# Patient Record
Sex: MALE | URBAN - METROPOLITAN AREA
[De-identification: names, ages, dates, MRNs, and addresses within clinical notes are randomized per-mention and may not be internally consistent; named-entity substitution may affect disease eponyms.]

---

## 2017-03-08 PROBLEM — I50.42 CHRONIC COMBINED SYSTOLIC AND DIASTOLIC CONGESTIVE HEART FAILURE (HCC): Status: ACTIVE | Noted: 2017-03-08

## 2017-04-05 PROBLEM — D58.2 ELEVATED HEMOGLOBIN (HCC): Status: ACTIVE | Noted: 2017-04-05

## 2024-01-25 ENCOUNTER — LAB REQUISITION (OUTPATIENT)
Dept: LAB | Age: 89
End: 2024-01-25

## 2024-01-25 DIAGNOSIS — Z13.9 ENCOUNTER FOR SCREENING, UNSPECIFIED: ICD-10-CM

## 2024-01-25 PROCEDURE — PSEU9049 QUANTIFERON TB PLUS: Performed by: CLINICAL MEDICAL LABORATORY

## 2024-01-25 PROCEDURE — 86480 TB TEST CELL IMMUN MEASURE: CPT | Performed by: CLINICAL MEDICAL LABORATORY

## 2024-01-27 LAB
GAMMA INTERFERON BACKGROUND BLD IA-ACNC: 0.04 IU/ML
M TB IFN-G BLD-IMP: NEGATIVE
M TB IFN-G CD4+ BCKGRND COR BLD-ACNC: 0 IU/ML
M TB IFN-G CD4+CD8+ BCKGRND COR BLD-ACNC: 0 IU/ML
MITOGEN IGNF BCKGRD COR BLD-ACNC: 8.22 IU/ML

## 2024-04-27 ENCOUNTER — APPOINTMENT (OUTPATIENT)
Dept: CT IMAGING | Facility: HOSPITAL | Age: 89
End: 2024-04-27
Attending: NURSE PRACTITIONER
Payer: MEDICARE

## 2024-04-27 ENCOUNTER — APPOINTMENT (OUTPATIENT)
Dept: GENERAL RADIOLOGY | Facility: HOSPITAL | Age: 89
End: 2024-04-27
Attending: NURSE PRACTITIONER
Payer: MEDICARE

## 2024-04-27 ENCOUNTER — APPOINTMENT (OUTPATIENT)
Dept: CT IMAGING | Facility: HOSPITAL | Age: 89
End: 2024-04-27
Payer: MEDICARE

## 2024-04-27 ENCOUNTER — HOSPITAL ENCOUNTER (EMERGENCY)
Facility: HOSPITAL | Age: 89
Discharge: HOME | End: 2024-04-28
Payer: MEDICARE

## 2024-04-27 DIAGNOSIS — Z79.01 ANTICOAGULATED: ICD-10-CM

## 2024-04-27 DIAGNOSIS — S09.90XA CLOSED HEAD INJURY, INITIAL ENCOUNTER: ICD-10-CM
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
## 2024-04-27 PROCEDURE — 72170 X-RAY EXAM OF PELVIS: CPT | Performed by: NURSE PRACTITIONER

## 2024-04-27 PROCEDURE — 99284 EMERGENCY DEPT VISIT MOD MDM: CPT

## 2024-04-27 PROCEDURE — 71045 X-RAY EXAM CHEST 1 VIEW: CPT | Performed by: NURSE PRACTITIONER

## 2024-04-27 PROCEDURE — 70450 CT HEAD/BRAIN W/O DYE: CPT

## 2024-04-27 PROCEDURE — 72125 CT NECK SPINE W/O DYE: CPT | Performed by: NURSE PRACTITIONER

## 2024-04-28 ENCOUNTER — APPOINTMENT (OUTPATIENT)
Dept: GENERAL RADIOLOGY | Facility: HOSPITAL | Age: 89
End: 2024-04-28
Attending: STUDENT IN AN ORGANIZED HEALTH CARE EDUCATION/TRAINING PROGRAM
Payer: MEDICARE

## 2024-04-28 ENCOUNTER — HOSPITAL ENCOUNTER (EMERGENCY)
Facility: HOSPITAL | Age: 89
Discharge: HOME | End: 2024-04-29
Attending: STUDENT IN AN ORGANIZED HEALTH CARE EDUCATION/TRAINING PROGRAM
Payer: MEDICARE

## 2024-04-28 ENCOUNTER — APPOINTMENT (OUTPATIENT)
Dept: CT IMAGING | Facility: HOSPITAL | Age: 89
End: 2024-04-28
Attending: STUDENT IN AN ORGANIZED HEALTH CARE EDUCATION/TRAINING PROGRAM
Payer: MEDICARE

## 2024-04-28 VITALS
HEART RATE: 69 BPM | DIASTOLIC BLOOD PRESSURE: 95 MMHG | TEMPERATURE: 98 F | BODY MASS INDEX: 28 KG/M2 | HEIGHT: 69 IN | SYSTOLIC BLOOD PRESSURE: 185 MMHG | RESPIRATION RATE: 17 BRPM | OXYGEN SATURATION: 93 %
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
## 2024-04-28 PROCEDURE — 70450 CT HEAD/BRAIN W/O DYE: CPT | Performed by: STUDENT IN AN ORGANIZED HEALTH CARE EDUCATION/TRAINING PROGRAM

## 2024-04-28 PROCEDURE — 72125 CT NECK SPINE W/O DYE: CPT | Performed by: STUDENT IN AN ORGANIZED HEALTH CARE EDUCATION/TRAINING PROGRAM

## 2024-04-28 PROCEDURE — 73502 X-RAY EXAM HIP UNI 2-3 VIEWS: CPT | Performed by: STUDENT IN AN ORGANIZED HEALTH CARE EDUCATION/TRAINING PROGRAM

## 2024-04-28 PROCEDURE — 99285 EMERGENCY DEPT VISIT HI MDM: CPT

## 2024-04-28 PROCEDURE — 99284 EMERGENCY DEPT VISIT MOD MDM: CPT

## 2024-04-28 NOTE — ED PROVIDER NOTES
Patient Seen in: [facility] Emergency Department      History     Chief Complaint   Patient presents with    Fall     Stated Complaint: Fall on thinners    Subjective:   89yo/m w hx of CHF, MI, pHTN, CAD reports to the ED w c/o a fall. He was at a SNF, fall was not witnessed. He is alert to person and place at baseline and reports \"I am not injured\". He has a posterior scalp hematoma, is anticoagulated but denies headache, dizziness. Denies chest pain. Denies vomiting. Denies any complaints.             Objective:   Past Medical History:    Acute on chronic systolic congestive heart failure (HCC)    Chronic combined systolic and diastolic congestive heart failure (HCC)    Chronic sinus bradycardia    Chronic systolic congestive heart failure (HCC)    Coronary artery disease involving native heart without angina pectoris, unspecified vessel or lesion type    Elevated hemoglobin (HCC)    History of myocardial infarction    Hypertensive CHF (congestive heart failure) (HCC)    Pulmonary hypertension (HCC)              History reviewed. No pertinent surgical history.             Social History     Socioeconomic History    Marital status:    Tobacco Use    Smoking status: Never    Smokeless tobacco: Never   Substance and Sexual Activity    Alcohol use: No     Alcohol/week: 0.0 standard drinks of alcohol    Drug use: No     Social Determinants of Health     Financial Resource Strain: Low Risk  (8/2/2023)    Received from [facility]    Overall Financial Resource Strain (CARDIA)     Difficulty of Paying Living Expenses: Not very hard   Food Insecurity: No Food Insecurity (8/2/2023)    Received from [facility]    Hunger Vital Sign     Worried About Running Out of Food in the Last Year: Never true     Ran Out of Food in the Last Year: Never true   Transportation Needs: No Transportation Needs (8/2/2023)    Received from [facility]    PRAPARE - Transportation     Lack of  Transportation (Medical): No     Lack of Transportation (Non-Medical): No   Physical Activity: Unknown (8/2/2023)    Received from [facility]    Exercise Vital Sign     Days of Exercise per Week: 0 days   Stress: Unknown (7/26/2022)    Received from [facility]    [employer] [place] [employer] - Occupational Stress Questionnaire     Feeling of Stress : Patient declined   Social Connections: Unknown (8/2/2023)    Received from [facility]    Social Connection and Isolation Panel [NHANES]     Frequency of Communication with Friends and Family: More than three times a week     Frequency of Social Gatherings with Friends and Family: More than three times a week     Attends Restorationism Services: Patient declined     Active Member of Clubs or Organizations: No     Marital Status:    Housing Stability: Low Risk  (8/2/2023)    Received from [facility]    Housing Stability Vital Sign     Unable to Pay for Housing in the Last Year: No     Number of Places Lived in the Last Year: 1     In the last 12 months, was there a time when you did not have a steady place to sleep or slept in a shelter (including now)?: No              Review of Systems   All other systems reviewed and are negative.      Positive for stated complaint: Fall on thinners  Other systems are as noted in HPI.  Constitutional and vital signs reviewed.      All other systems reviewed and negative except as noted above.    Physical Exam     ED Triage Vitals [04/27/24 2240]   BP (!) 179/92   Pulse 71   Resp 20   Temp 98.1 °F (36.7 °C)   Temp src Oral   SpO2 94 %   O2 Device None (Room air)       Current:BP (!) 179/92   Pulse 71   Temp 98.1 °F (36.7 °C) (Oral)   Resp 20   Ht 175.3 cm (5' 9\")   SpO2 94%   BMI 28.35 kg/m²         Physical Exam  Vitals and nursing note reviewed.   Constitutional:       General: He is not in acute distress.     Appearance: He is well-developed.   HENT:      Head:  Normocephalic.      Comments: Posterior scalp hematoma, no crepitus, no edema     Nose: Nose normal.      Mouth/Throat:      Mouth: Mucous membranes are moist.   Eyes:      Conjunctiva/sclera: Conjunctivae normal.      Pupils: Pupils are equal, round, and reactive to light.   Cardiovascular:      Rate and Rhythm: Normal rate and regular rhythm.      Heart sounds: Normal heart sounds.   Pulmonary:      Effort: Pulmonary effort is normal.      Breath sounds: Normal breath sounds.   Abdominal:      General: Bowel sounds are normal.      Palpations: Abdomen is soft.   Musculoskeletal:         General: No tenderness or deformity. Normal range of motion.      Cervical back: Normal range of motion and neck supple.   Skin:     General: Skin is warm and dry.      Capillary Refill: Capillary refill takes less than 2 seconds.      Findings: No rash.      Comments: Normal color   Neurological:      General: No focal deficit present.      Mental Status: He is alert and oriented to person, place, and time.      GCS: GCS eye subscore is 4. GCS verbal subscore is 5. GCS motor subscore is 6.      Cranial Nerves: No cranial nerve deficit.      Gait: Gait normal.               ED Course   Labs Reviewed - No data to display     IMPRESSION:  Head:  -No evidence of an acute intracranial abnormality or acute fractures  -Mild chronic small vessel ischemic changes in the white matter; mild volume loss and mild ventriculomegaly    C-spine:  -No evidence of an acute fracture or dislocation  -Severe multilevel degenerative changes and severe multilevel foraminal stenosis    -Moderate tracheal dilatation; incomplete fusion of C1 posteriorly       IMPRESSION:  Chest:  -No significant focal consolidation or pleural effusions; mild, nonspecific interstitial prominence  -Mild enlargement of the cardiac silhouette    -Severe atherosclerosis    Pelvis:  -No evidence of acute fractures    -Sclerotic focus in the left femoral neck       MDM                 Medical Decision Making  89yo/m w hx and exam as stated c/o fall    Non toxic, well appearing  Afebrile  No vomiting  At baseline  Ct brain/neck non acute  Xr chest and pelv non acute  No somatic complaints  Overall stable    Plan  Dc to SNF  Close fu      Amount and/or Complexity of Data Reviewed  Labs:  Decision-making details documented in ED Course.  Radiology:  Decision-making details documented in ED Course.    Risk  OTC drugs.  Prescription drug management.        Disposition and Plan     Clinical Impression:  1. Fall, initial encounter    2. Closed head injury, initial encounter    3. Anticoagulated         Disposition:  Discharge  4/28/2024 12:24 am    Follow-up:  Oscar Pittman MD  [address]  [address] [place], [place] [id number] [phone]    Follow up in 2 day(s)      We recommend that you schedule follow up care with a primary care provider within the next three months to obtain basic health screening including reassessment of your blood pressure.      Medications Prescribed:  Current Discharge Medication List

## 2024-04-28 NOTE — ED QUICK NOTES
Spoke with Parveen at [facility] and gave report on patient returning.

## 2024-04-28 NOTE — ED INITIAL ASSESSMENT (HPI)
Pt here by EMS after unwitnessed fall on blood thinners, small hematoma to back of head. Pt A+ox2. Hx dementia.

## 2024-04-29 VITALS
RESPIRATION RATE: 22 BRPM | HEART RATE: 62 BPM | TEMPERATURE: 97 F | OXYGEN SATURATION: 90 % | SYSTOLIC BLOOD PRESSURE: 159 MMHG | DIASTOLIC BLOOD PRESSURE: 92 MMHG

## 2024-04-29 NOTE — ED QUICK NOTES
Pt returning home back to [facility]. Called and spoke with Jo-Ann to inform her that pt will be returning back to facility [facility].    [facility] contacted and will be here at 0125.    Pt stable and sleeping at this time. Will continue to monitor

## 2024-04-29 NOTE — ED PROVIDER NOTES
Patient Seen in: [facility] Emergency Department      History     Chief Complaint   Patient presents with    Fall     Stated Complaint: fall    Subjective:   HPI    89-year-old male history of hypertension CHF CAD presenting for evaluation of a fall.  Brought in from [facility] by EMS.  Had a unwitnessed fall this evening.  Placed in c-collar and backboard by EMS.  Patient on Eliquis.  Patient denies any complaints at this time.  Told triage nurse that he had head an right hip discomfort. Seen yesterday for fall.     Objective:   Past Medical History:    Acute on chronic systolic congestive heart failure (HCC)    Chronic combined systolic and diastolic congestive heart failure (HCC)    Chronic sinus bradycardia    Chronic systolic congestive heart failure (HCC)    Coronary artery disease involving native heart without angina pectoris, unspecified vessel or lesion type    Elevated hemoglobin (HCC)    History of myocardial infarction    Hypertensive CHF (congestive heart failure) (HCC)    Pulmonary hypertension (HCC)              History reviewed. No pertinent surgical history.             Social History     Socioeconomic History    Marital status:    Tobacco Use    Smoking status: Never    Smokeless tobacco: Never   Substance and Sexual Activity    Alcohol use: No     Alcohol/week: 0.0 standard drinks of alcohol    Drug use: No     Social Determinants of Health     Financial Resource Strain: Low Risk  (8/2/2023)    Received from [facility]    Overall Financial Resource Strain (CARDIA)     Difficulty of Paying Living Expenses: Not very hard   Food Insecurity: No Food Insecurity (8/2/2023)    Received from [facility]    Hunger Vital Sign     Worried About Running Out of Food in the Last Year: Never true     Ran Out of Food in the Last Year: Never true   Transportation Needs: No Transportation Needs (8/2/2023)    Received from [facility]    PRAPARE -  Transportation     Lack of Transportation (Medical): No     Lack of Transportation (Non-Medical): No   Physical Activity: Unknown (8/2/2023)    Received from [facility]    Exercise Vital Sign     Days of Exercise per Week: 0 days   Stress: Unknown (7/26/2022)    Received from [facility]    [employer] [place] of Occupational Health - Occupational Stress Questionnaire     Feeling of Stress : Patient declined   Social Connections: Unknown (8/2/2023)    Received from [facility]    Social Connection and Isolation Panel [NHANES]     Frequency of Communication with Friends and Family: More than three times a week     Frequency of Social Gatherings with Friends and Family: More than three times a week     Attends [employer] Services: Patient declined     Active Member of Clubs or Organizations: No     Marital Status:    Housing Stability: Low Risk  (8/2/2023)    Received from [facility]    Housing Stability Vital Sign     Unable to Pay for Housing in the Last Year: No     Number of Places Lived in the Last Year: 1     In the last 12 months, was there a time when you did not have a steady place to sleep or slept in a shelter (including now)?: No              Review of Systems    Positive for stated complaint: fall  Other systems are as noted in HPI.  Constitutional and vital signs reviewed.      All other systems reviewed and negative except as noted above.    Physical Exam     ED Triage Vitals [04/28/24 2211]   BP (!) 164/85   Pulse 70   Resp 18   Temp 97.3 °F (36.3 °C)   Temp src Temporal   SpO2 93 %   O2 Device None (Room air)       Current:BP (!) 176/99   Pulse 62   Temp 97.3 °F (36.3 °C) (Temporal)   Resp 22   SpO2 93%         Physical Exam    Constitutional: awake, alert, no sig distress  HENT: mmm, no lesions,  Neck: normal range of motion, no tenderness, supple.  Eyes: PERRL, EOMI, conjunctiva normal, no discharge. Sclera anicteric.  Cardiovascular: rr no  murmur  Respiratory: Normal breath sounds, no respiratory distress, no wheezing, no chest tenderness.  GI: Bowel sounds normal, Soft, no tenderness, no masses, no pulsatile masses.  : No CVA tenderness.  Skin: Warm, dry, no erythema, no rash.  Musculoskeletal: Intact distal pulses, no edema, no tenderness, no cyanosis, no clubbing. Good range of motion in all major joints. No tenderness to palpation or major deformities noted. Back- No tenderness.  Neurologic: Alert & oriented x 3, normal motor function, normal sensory function, no focal deficits noted.  Psych: Calm, cooperative, nl affect        ED Course   Labs Reviewed - No data to display       ED Course as of 04/29/24 0019  ------------------------------------------------------------  Time: 04/29 0013  Comment: I have independently reviewed patients CT brain and did not appreciate any acute intracranial abnormalities, no evidence for ICH skull fracture or mass lesion.    I have independently reviewed patient's CT cervical spine did not appreciate any acute bony abnormalities fractures or subluxations  ------------------------------------------------------------  Time: 04/29 0019  Comment: I have independently reviewed patient's x-ray of hip do not appreciate any bony abnormalities.              MDM      89-year-old male with history as documented above presenting with evaluation of ground-level fall.  Arrival vitals stable and reassuring.  Is at neurologic baseline and moves all 4 extremities equally.  Will obtain traumatic imaging of head C-spine right hip.      Return precautions and follow-up instructions were discussed with patient who voiced understanding and agreement the plan.  All questions were answered to patient satisfaction.                             Medical Decision Making      Disposition and Plan     Clinical Impression:  1. Ground-level fall         Disposition:  Discharge  4/29/2024 12:19 am    Follow-up:  Oscar Pittman MD  [address]  [address]  [facility] [address]  [phone]    Follow up in 2 day(s)      [facility] Emergency Department  [address]  [facility] [address]  [phone]  Follow up  As needed, If symptoms worsen    We recommend that you schedule follow up care with a primary care provider within the next three months to obtain basic health screening including reassessment of your blood pressure.      Medications Prescribed:  Current Discharge Medication List

## 2024-04-29 NOTE — ED INITIAL ASSESSMENT (HPI)
To [facility] by OBT from [facility] unit. NH. Unwitnessed fall this evening. Arrives on a board and c-collar applied. States some right hip pain and head pain. Takes eliquis.

## 2024-06-16 ENCOUNTER — APPOINTMENT (OUTPATIENT)
Dept: GENERAL RADIOLOGY | Facility: HOSPITAL | Age: 89
End: 2024-06-16
Attending: EMERGENCY MEDICINE

## 2024-06-16 ENCOUNTER — APPOINTMENT (OUTPATIENT)
Dept: CT IMAGING | Facility: HOSPITAL | Age: 89
End: 2024-06-16
Attending: EMERGENCY MEDICINE

## 2024-06-16 ENCOUNTER — HOSPITAL ENCOUNTER (EMERGENCY)
Facility: HOSPITAL | Age: 89
Discharge: NURSING, REHAB OR LONG-TERM CARE | End: 2024-06-16
Attending: EMERGENCY MEDICINE

## 2024-06-16 ENCOUNTER — HOSPITAL ENCOUNTER (EMERGENCY)
Facility: HOSPITAL | Age: 89
Discharge: HOME | End: 2024-06-16
Attending: EMERGENCY MEDICINE

## 2024-06-16 VITALS
WEIGHT: 180 LBS | HEIGHT: 70 IN | RESPIRATION RATE: 20 BRPM | BODY MASS INDEX: 25.77 KG/M2 | DIASTOLIC BLOOD PRESSURE: 95 MMHG | TEMPERATURE: 98 F | HEART RATE: 71 BPM | OXYGEN SATURATION: 94 % | SYSTOLIC BLOOD PRESSURE: 179 MMHG

## 2024-06-16 DIAGNOSIS — S40.021A CONTUSION OF MULTIPLE SITES OF RIGHT UPPER ARM, INITIAL ENCOUNTER: Primary | ICD-10-CM

## 2024-06-16 PROCEDURE — 73060 X-RAY EXAM OF HUMERUS: CPT | Performed by: EMERGENCY MEDICINE

## 2024-06-16 PROCEDURE — 99284 EMERGENCY DEPT VISIT MOD MDM: CPT

## 2024-06-16 PROCEDURE — 73200 CT UPPER EXTREMITY W/O DYE: CPT | Performed by: EMERGENCY MEDICINE

## 2024-06-16 PROCEDURE — 73030 X-RAY EXAM OF SHOULDER: CPT | Performed by: EMERGENCY MEDICINE

## 2024-06-16 RX ORDER — DONEPEZIL HYDROCHLORIDE 10 MG/1
10 TABLET, FILM COATED ORAL NIGHTLY
COMMUNITY

## 2024-06-16 RX ORDER — TRAMADOL HYDROCHLORIDE 50 MG/1
50 TABLET ORAL EVERY 6 HOURS PRN
COMMUNITY

## 2024-06-16 RX ORDER — QUETIAPINE FUMARATE 25 MG/1
12.5 TABLET, FILM COATED ORAL EVERY 6 HOURS PRN
COMMUNITY

## 2024-06-16 RX ORDER — LORAZEPAM 1 MG/1
1 TABLET ORAL EVERY 4 HOURS PRN
COMMUNITY

## 2024-06-16 RX ORDER — QUETIAPINE FUMARATE 25 MG/1
25 TABLET, FILM COATED ORAL 3 TIMES DAILY
COMMUNITY

## 2024-06-16 RX ORDER — CITALOPRAM 20 MG/1
20 TABLET ORAL DAILY
COMMUNITY

## 2024-06-16 RX ORDER — TETRAHYDROZOLINE HCL 0.05 %
1 DROPS OPHTHALMIC (EYE) AS NEEDED
COMMUNITY

## 2024-06-16 NOTE — ED QUICK NOTES
Pt began to yell for help. When assistance offered pt replied \"Is this a [facility]?\" Pt lift his hand, looked at me and said \"She deserves a slap\". Pt attempted to kick PCT. Able to redirect pt, and assisted pt to bathroom. When assisting back to bed, pt again commented \" What is this a [facility]?\" Explained he was in the hospital and we were trying to get him back home soon. He replied \"you better get me there soon.\"

## 2024-06-16 NOTE — ED PROVIDER NOTES
Patient Seen in: [facility] Emergency Department      History     Chief Complaint   Patient presents with    Arm or Hand Injury     Stated Complaint: rt shoulder pain    Subjective:   HPI    The patient is a 89-year-old male with a history of dementia, hypertension, CHF sent from nursing facility for bruising to his right humerus that they noticed this morning.  They did not witness any fall and patient does not remember falling.  He denies any pain at rest but only when moving the upper arm and shoulder does complain of some pain.  He denies neck pain or headache.  He denies any pain to any other extremities.  No recent illness cough fevers or chills    Objective:   Past Medical History:    Acute on chronic systolic congestive heart failure (HCC)    Chronic combined systolic and diastolic congestive heart failure (HCC)    Chronic sinus bradycardia    Chronic systolic congestive heart failure (HCC)    Coronary artery disease involving native heart without angina pectoris, unspecified vessel or lesion type    Dementia (HCC)    Elevated hemoglobin (HCC)    History of myocardial infarction    Hypertensive CHF (congestive heart failure) (HCC)    Pulmonary hypertension (HCC)              History reviewed. No pertinent surgical history.             Social History     Socioeconomic History    Marital status:    Tobacco Use    Smoking status: Never    Smokeless tobacco: Never   Vaping Use    Vaping status: Never Used   Substance and Sexual Activity    Alcohol use: No     Alcohol/week: 0.0 standard drinks of alcohol    Drug use: No     Social Determinants of Health     Financial Resource Strain: Low Risk  (8/2/2023)    Received from [facility]    Overall Financial Resource Strain (CARDIA)     Difficulty of Paying Living Expenses: Not very hard   Food Insecurity: No Food Insecurity (8/2/2023)    Received from [facility]    Hunger Vital Sign     Worried About Running Out of Food in the Last  Year: Never true     Ran Out of Food in the Last Year: Never true   Transportation Needs: No Transportation Needs (8/2/2023)    Received from [facility]    PRAPARE - Transportation     Lack of Transportation (Medical): No     Lack of Transportation (Non-Medical): No   Physical Activity: Unknown (8/2/2023)    Received from [facility]    Exercise Vital Sign     Days of Exercise per Week: 0 days   Stress: Unknown (7/26/2022)    Received from [facility]    [employer] [place] of [employer]ational Health - Occupational Stress Questionnaire     Feeling of Stress : Patient declined   Social Connections: Unknown (8/2/2023)    Received from [facility]    Social Connection and Isolation Panel [NHANES]     Frequency of Communication with Friends and Family: More than three times a week     Frequency of Social Gatherings with Friends and Family: More than three times a week     Attends Mandaeism Services: Patient declined     Active Member of Clubs or Organizations: No     Marital Status:    Housing Stability: Low Risk  (8/2/2023)    Received from [facility]    Housing Stability Vital Sign     Unable to Pay for Housing in the Last Year: No     Number of Places Lived in the Last Year: 1     Unstable Housing in the Last Year: No              Review of Systems    Positive for stated complaint: rt shoulder pain  Other systems are as noted in HPI.  Constitutional and vital signs reviewed.      All other systems reviewed and negative except as noted above.    Physical Exam     ED Triage Vitals [06/16/24 0906]   /71   Pulse 67   Resp 20   Temp 98.2 °F (36.8 °C)   Temp src Temporal   SpO2 93 %   O2 Device None (Room air)       Current Vitals:   Vital Signs  BP: (!) 179/95  Pulse: 71  Resp: 20  Temp: 98.2 °F (36.8 °C)  Temp src: Temporal  MAP (mmHg): (!) 117    Oxygen Therapy  SpO2: 94 %  O2 Device: None (Room air)            Physical Exam  Vitals and nursing note reviewed.    Constitutional:       General: He is not in acute distress.     Appearance: Normal appearance. He is not ill-appearing.   HENT:      Head: Normocephalic and atraumatic.      Mouth/Throat:      Mouth: Mucous membranes are moist.      Pharynx: Oropharynx is clear.   Cardiovascular:      Rate and Rhythm: Normal rate and regular rhythm.   Pulmonary:      Effort: Pulmonary effort is normal.      Breath sounds: Normal breath sounds.   Chest:      Chest wall: No tenderness.   Abdominal:      Palpations: Abdomen is soft.      Tenderness: There is no abdominal tenderness.   Musculoskeletal:      Right shoulder: No swelling, deformity or tenderness. Normal range of motion.      Left shoulder: Normal.      Right upper arm: Tenderness present. No deformity or bony tenderness.        Arms:       Cervical back: Normal range of motion and neck supple. No tenderness.      Comments: Tenderness and bruising to right upper humerus, no deformity, elbow and shoulder nontender with full range of motion pulses strong and equal throughout   Skin:     General: Skin is warm and dry.      Capillary Refill: Capillary refill takes less than 2 seconds.      Findings: Bruising present.   Neurological:      Mental Status: He is alert. Mental status is at baseline. He is disoriented.       Differential diagnosis includes contusion, fracture, dislocation        ED Course     Labs Reviewed   RAINBOW DRAW LAVENDER   RAINBOW DRAW LIGHT GREEN   RAINBOW DRAW GOLD   RAINBOW DRAW BLUE                      MDM                                         Medical Decision Making  Patient with contusion/hematoma on CT scan no fracture  Will return to nursing home facility  Vital signs stable prior to discharge    Problems Addressed:  Contusion of multiple sites of right upper arm, initial encounter: acute illness or injury    Amount and/or Complexity of Data Reviewed  Independent Historian: EMS  External Data Reviewed: notes.     Details: From neurologist visit  on 3/28/2024 regarding patient's dementia and baseline mental status reviewed  Radiology: ordered and independent interpretation performed.     Details: No fracture other results per radiologist    Risk  OTC drugs.        Disposition and Plan     Clinical Impression:  1. Contusion of multiple sites of right upper arm, initial encounter         Disposition:  Discharge  6/16/2024 11:18 am    Follow-up:  Oscar Pittman MD  [address] [address]  [phone]    Follow up      We recommend that you schedule follow up care with a primary care provider within the next three months to obtain basic health screening including reassessment of your blood pressure.      Medications Prescribed:  Discharge Medication List as of 6/16/2024 11:20 AM

## 2024-06-16 NOTE — ED INITIAL ASSESSMENT (HPI)
Pt arrived per EMS from [facility]. His RN noticed today his rt shoulder was red and swollen. Pt only c/o discomfort. Obvious deformity to rt shoulder and bruising to rt upper arm. PT has dementia. A/o x 1 as at baseline. Pt ans staff unaware of any fall.

## 2024-10-05 ENCOUNTER — HOSPITAL ENCOUNTER (INPATIENT)
Facility: HOSPITAL | Age: 89
LOS: 1 days | Discharge: NURSING, REHAB OR LONG-TERM CARE | End: 2024-10-08
Attending: EMERGENCY MEDICINE | Admitting: INTERNAL MEDICINE
Payer: MEDICARE

## 2024-10-05 ENCOUNTER — APPOINTMENT (OUTPATIENT)
Dept: CT IMAGING | Facility: HOSPITAL | Age: 89
End: 2024-10-05
Attending: EMERGENCY MEDICINE
Payer: MEDICARE

## 2024-10-05 ENCOUNTER — APPOINTMENT (OUTPATIENT)
Dept: GENERAL RADIOLOGY | Facility: HOSPITAL | Age: 89
End: 2024-10-05
Attending: EMERGENCY MEDICINE
Payer: MEDICARE

## 2024-10-05 DIAGNOSIS — I50.9 ACUTE ON CHRONIC CONGESTIVE HEART FAILURE, UNSPECIFIED HEART FAILURE TYPE (HCC): Primary | ICD-10-CM

## 2024-10-05 DIAGNOSIS — R07.89 LEFT-SIDED CHEST WALL PAIN: ICD-10-CM

## 2024-10-05 DIAGNOSIS — R09.02 HYPOXIA: ICD-10-CM

## 2024-10-05 DIAGNOSIS — F41.9 ANXIETY: ICD-10-CM

## 2024-10-05 LAB
ALBUMIN SERPL-MCNC: 4.4 G/DL (ref 3.2–4.8)
ALBUMIN/GLOB SERPL: 1.4 {RATIO} (ref 1–2)
ALP LIVER SERPL-CCNC: 100 U/L
ALT SERPL-CCNC: 30 U/L
ANION GAP SERPL CALC-SCNC: 10 MMOL/L (ref 0–18)
AST SERPL-CCNC: 35 U/L (ref ?–34)
ATRIAL RATE: 359 BPM
BASOPHILS # BLD AUTO: 0.06 X10(3) UL (ref 0–0.2)
BASOPHILS NFR BLD AUTO: 0.5 %
BILIRUB SERPL-MCNC: 3.6 MG/DL (ref 0.2–0.9)
BUN BLD-MCNC: 15 MG/DL (ref 9–23)
BUN/CREAT SERPL: 14.2 (ref 10–20)
CALCIUM BLD-MCNC: 9.6 MG/DL (ref 8.7–10.4)
CHLORIDE SERPL-SCNC: 106 MMOL/L (ref 98–112)
CHOLEST SERPL-MCNC: 144 MG/DL (ref ?–200)
CO2 SERPL-SCNC: 27 MMOL/L (ref 21–32)
CREAT BLD-MCNC: 1.06 MG/DL
DEPRECATED RDW RBC AUTO: 55.6 FL (ref 35.1–46.3)
EGFRCR SERPLBLD CKD-EPI 2021: 66 ML/MIN/1.73M2 (ref 60–?)
EOSINOPHIL # BLD AUTO: 0.13 X10(3) UL (ref 0–0.7)
EOSINOPHIL NFR BLD AUTO: 1 %
ERYTHROCYTE [DISTWIDTH] IN BLOOD BY AUTOMATED COUNT: 15.8 % (ref 11–15)
GLOBULIN PLAS-MCNC: 3.2 G/DL (ref 2–3.5)
GLUCOSE BLD-MCNC: 136 MG/DL (ref 70–99)
HCT VFR BLD AUTO: 55.1 %
HDLC SERPL-MCNC: 41 MG/DL (ref 40–59)
HGB BLD-MCNC: 17.8 G/DL
IMM GRANULOCYTES # BLD AUTO: 0.06 X10(3) UL (ref 0–1)
IMM GRANULOCYTES NFR BLD: 0.5 %
LDLC SERPL CALC-MCNC: 86 MG/DL (ref ?–100)
LIPASE SERPL-CCNC: 31 U/L (ref 12–53)
LYMPHOCYTES # BLD AUTO: 1.97 X10(3) UL (ref 1–4)
LYMPHOCYTES NFR BLD AUTO: 14.9 %
MCH RBC QN AUTO: 30.7 PG (ref 26–34)
MCHC RBC AUTO-ENTMCNC: 32.3 G/DL (ref 31–37)
MCV RBC AUTO: 95.2 FL
MONOCYTES # BLD AUTO: 1.19 X10(3) UL (ref 0.1–1)
MONOCYTES NFR BLD AUTO: 9 %
MRSA DNA SPEC QL NAA+PROBE: NEGATIVE
NEUTROPHILS # BLD AUTO: 9.85 X10 (3) UL (ref 1.5–7.7)
NEUTROPHILS # BLD AUTO: 9.85 X10(3) UL (ref 1.5–7.7)
NEUTROPHILS NFR BLD AUTO: 74.1 %
NONHDLC SERPL-MCNC: 103 MG/DL (ref ?–130)
NT-PROBNP SERPL-MCNC: 6576 PG/ML (ref ?–450)
OSMOLALITY SERPL CALC.SUM OF ELEC: 299 MOSM/KG (ref 275–295)
PLATELET # BLD AUTO: 467 10(3)UL (ref 150–450)
PLATELETS.RETICULATED NFR BLD AUTO: 10.9 % (ref 0–7)
POTASSIUM SERPL-SCNC: 4.4 MMOL/L (ref 3.5–5.1)
PROT SERPL-MCNC: 7.6 G/DL (ref 5.7–8.2)
Q-T INTERVAL: 408 MS
QRS DURATION: 142 MS
QTC CALCULATION (BEZET): 512 MS
R AXIS: -68 DEGREES
RBC # BLD AUTO: 5.79 X10(6)UL
SODIUM SERPL-SCNC: 143 MMOL/L (ref 136–145)
T AXIS: 101 DEGREES
TRIGL SERPL-MCNC: 92 MG/DL (ref 30–149)
TROPONIN I SERPL HS-MCNC: 59 NG/L
TROPONIN I SERPL HS-MCNC: 61 NG/L
TROPONIN I SERPL HS-MCNC: 64 NG/L
VENTRICULAR RATE: 95 BPM
VLDLC SERPL CALC-MCNC: 15 MG/DL (ref 0–30)
WBC # BLD AUTO: 13.3 X10(3) UL (ref 4–11)

## 2024-10-05 PROCEDURE — 83690 ASSAY OF LIPASE: CPT | Performed by: EMERGENCY MEDICINE

## 2024-10-05 PROCEDURE — 93010 ELECTROCARDIOGRAM REPORT: CPT

## 2024-10-05 PROCEDURE — 84484 ASSAY OF TROPONIN QUANT: CPT | Performed by: EMERGENCY MEDICINE

## 2024-10-05 PROCEDURE — 85025 COMPLETE CBC W/AUTO DIFF WBC: CPT | Performed by: EMERGENCY MEDICINE

## 2024-10-05 PROCEDURE — 84484 ASSAY OF TROPONIN QUANT: CPT | Performed by: INTERNAL MEDICINE

## 2024-10-05 PROCEDURE — 83880 ASSAY OF NATRIURETIC PEPTIDE: CPT | Performed by: EMERGENCY MEDICINE

## 2024-10-05 PROCEDURE — 80061 LIPID PANEL: CPT | Performed by: EMERGENCY MEDICINE

## 2024-10-05 PROCEDURE — 80053 COMPREHEN METABOLIC PANEL: CPT | Performed by: EMERGENCY MEDICINE

## 2024-10-05 PROCEDURE — 99285 EMERGENCY DEPT VISIT HI MDM: CPT

## 2024-10-05 PROCEDURE — 87641 MR-STAPH DNA AMP PROBE: CPT | Performed by: EMERGENCY MEDICINE

## 2024-10-05 PROCEDURE — 74177 CT ABD & PELVIS W/CONTRAST: CPT | Performed by: EMERGENCY MEDICINE

## 2024-10-05 PROCEDURE — 71045 X-RAY EXAM CHEST 1 VIEW: CPT | Performed by: EMERGENCY MEDICINE

## 2024-10-05 PROCEDURE — 71260 CT THORAX DX C+: CPT | Performed by: EMERGENCY MEDICINE

## 2024-10-05 PROCEDURE — 93005 ELECTROCARDIOGRAM TRACING: CPT

## 2024-10-05 PROCEDURE — 96374 THER/PROPH/DIAG INJ IV PUSH: CPT

## 2024-10-05 RX ORDER — FUROSEMIDE 40 MG
40 TABLET ORAL 2 TIMES DAILY
Status: DISCONTINUED | OUTPATIENT
Start: 2024-10-06 | End: 2024-10-07

## 2024-10-05 RX ORDER — TRAMADOL HYDROCHLORIDE 50 MG/1
50 TABLET ORAL EVERY 6 HOURS PRN
Status: DISCONTINUED | OUTPATIENT
Start: 2024-10-05 | End: 2024-10-06

## 2024-10-05 RX ORDER — ESCITALOPRAM OXALATE 10 MG/1
10 TABLET ORAL DAILY
Status: DISCONTINUED | OUTPATIENT
Start: 2024-10-06 | End: 2024-10-08

## 2024-10-05 RX ORDER — CARVEDILOL 6.25 MG/1
6.25 TABLET ORAL 2 TIMES DAILY
Status: DISCONTINUED | OUTPATIENT
Start: 2024-10-05 | End: 2024-10-08

## 2024-10-05 RX ORDER — HYDROCODONE BITARTRATE AND ACETAMINOPHEN 5; 325 MG/1; MG/1
2 TABLET ORAL EVERY 4 HOURS PRN
Status: DISCONTINUED | OUTPATIENT
Start: 2024-10-05 | End: 2024-10-08

## 2024-10-05 RX ORDER — DONEPEZIL HYDROCHLORIDE 10 MG/1
10 TABLET, FILM COATED ORAL NIGHTLY
Status: DISCONTINUED | OUTPATIENT
Start: 2024-10-05 | End: 2024-10-08

## 2024-10-05 RX ORDER — METOCLOPRAMIDE HYDROCHLORIDE 5 MG/ML
5 INJECTION INTRAMUSCULAR; INTRAVENOUS EVERY 8 HOURS PRN
Status: DISCONTINUED | OUTPATIENT
Start: 2024-10-05 | End: 2024-10-08

## 2024-10-05 RX ORDER — FUROSEMIDE 10 MG/ML
40 INJECTION INTRAMUSCULAR; INTRAVENOUS ONCE
Status: COMPLETED | OUTPATIENT
Start: 2024-10-05 | End: 2024-10-05

## 2024-10-05 RX ORDER — QUETIAPINE FUMARATE 25 MG/1
25 TABLET, FILM COATED ORAL 3 TIMES DAILY
Status: DISCONTINUED | OUTPATIENT
Start: 2024-10-05 | End: 2024-10-08

## 2024-10-05 RX ORDER — ASPIRIN 81 MG/1
81 TABLET ORAL DAILY
Status: DISCONTINUED | OUTPATIENT
Start: 2024-10-06 | End: 2024-10-08

## 2024-10-05 RX ORDER — ONDANSETRON 2 MG/ML
4 INJECTION INTRAMUSCULAR; INTRAVENOUS EVERY 6 HOURS PRN
Status: DISCONTINUED | OUTPATIENT
Start: 2024-10-05 | End: 2024-10-08

## 2024-10-05 RX ORDER — ACETAMINOPHEN 325 MG/1
650 TABLET ORAL EVERY 6 HOURS
Status: DISCONTINUED | OUTPATIENT
Start: 2024-10-05 | End: 2024-10-08

## 2024-10-05 RX ORDER — SODIUM PHOSPHATE, DIBASIC AND SODIUM PHOSPHATE, MONOBASIC 7; 19 G/230ML; G/230ML
1 ENEMA RECTAL ONCE AS NEEDED
Status: DISCONTINUED | OUTPATIENT
Start: 2024-10-05 | End: 2024-10-08

## 2024-10-05 RX ORDER — LOSARTAN POTASSIUM 25 MG/1
25 TABLET ORAL 2 TIMES DAILY
Status: DISCONTINUED | OUTPATIENT
Start: 2024-10-05 | End: 2024-10-08

## 2024-10-05 RX ORDER — ACETAMINOPHEN 325 MG/1
650 TABLET ORAL EVERY 4 HOURS PRN
Status: DISCONTINUED | OUTPATIENT
Start: 2024-10-05 | End: 2024-10-08

## 2024-10-05 RX ORDER — LORAZEPAM 0.5 MG/1
1 TABLET ORAL EVERY 4 HOURS PRN
Status: DISCONTINUED | OUTPATIENT
Start: 2024-10-05 | End: 2024-10-08

## 2024-10-05 RX ORDER — SENNOSIDES 8.6 MG
17.2 TABLET ORAL NIGHTLY PRN
Status: DISCONTINUED | OUTPATIENT
Start: 2024-10-05 | End: 2024-10-08

## 2024-10-05 RX ORDER — POLYETHYLENE GLYCOL 3350 17 G/17G
17 POWDER, FOR SOLUTION ORAL DAILY PRN
Status: DISCONTINUED | OUTPATIENT
Start: 2024-10-05 | End: 2024-10-08

## 2024-10-05 RX ORDER — ATORVASTATIN CALCIUM 10 MG/1
10 TABLET, FILM COATED ORAL NIGHTLY
Status: DISCONTINUED | OUTPATIENT
Start: 2024-10-05 | End: 2024-10-08

## 2024-10-05 RX ORDER — HYDROCODONE BITARTRATE AND ACETAMINOPHEN 5; 325 MG/1; MG/1
1 TABLET ORAL EVERY 4 HOURS PRN
Status: DISCONTINUED | OUTPATIENT
Start: 2024-10-05 | End: 2024-10-08

## 2024-10-05 RX ORDER — QUETIAPINE FUMARATE 25 MG/1
12.5 TABLET, FILM COATED ORAL EVERY 6 HOURS PRN
Status: DISCONTINUED | OUTPATIENT
Start: 2024-10-05 | End: 2024-10-08

## 2024-10-05 RX ORDER — BISACODYL 10 MG
10 SUPPOSITORY, RECTAL RECTAL
Status: DISCONTINUED | OUTPATIENT
Start: 2024-10-05 | End: 2024-10-08

## 2024-10-05 NOTE — H&P
[facility] Hospitalist H&P       CC:   Chief Complaint   Patient presents with    Pain        PCP: Oscar Pittman MD    History of Present Illness: Patient is a 89 year old male with PMH chronic systolic heart failure, Dementia living in [facility] care, generalized anxiety, hallucinations and delusions secondary to dementia, hyperlipidemia, pulmonary hypertension, atrial fibrillation on anticoagulation who presents from memory care with left-sided thorax pain.  Patient unable to provide history, daughter at bedside who reports over the last 2 days patient was wincing in pain with variable movements mostly focused on the left lower abdomen or left thorax she does not know of or is aware of any particular fall injury or trauma.  He has never had any similar complaints previous.  He is denying shortness of breath chest pain palpitations nausea vomiting fevers or chills.     In the emergency department workup revealed elevation in BNP chest x-ray and CT with pulmonary congestion mild hypoxia on room air to 90% CT of the abdomen without acute findings bilirubin was also noted to be elevated with a mild elevation in white blood cell count was also present.   Patient was given 40 mg of IV Lasix in the emergency department and admitted to the hospital for further observation.  I did discuss with the daughter in detail that discharge back to memory care may be appropriate she prefers observation with hospitalization   For the next 24 hours.         PMH  Past Medical History:    Acute on chronic systolic congestive heart failure (HCC)    Chronic combined systolic and diastolic congestive heart failure (HCC)    Chronic sinus bradycardia    Chronic systolic congestive heart failure (HCC)    Coronary artery disease involving native heart without angina pectoris, unspecified vessel or lesion type    Dementia (HCC)    Elevated hemoglobin (HCC)    History of myocardial infarction    Hypertensive CHF  (congestive heart failure) (HCC)    Pulmonary hypertension (HCC)        PSH  History reviewed. No pertinent surgical history.     ALL:  No Known Allergies     Home Medications:  No outpatient medications have been marked as taking for the 10/5/24 encounter (Hospital Encounter).         Soc Hx  Social History     Tobacco Use    Smoking status: Never    Smokeless tobacco: Never   Substance Use Topics    Alcohol use: No     Alcohol/week: 0.0 standard drinks of alcohol        Fam Hx  No family history on file.    Review of Systems  Comprehensive ROS reviewed and negative except for what's stated above.     OBJECTIVE:  BP (!) 175/94   Pulse 73   Temp 96.8 °F (36 °C) (Temporal)   Resp 23   Wt 170 lb (77.1 kg)   SpO2 94%   BMI 24.39 kg/m²   General:  Alert, watching TV is able to state his name but otherwise is disoriented   Head:  Normocephalic, without obvious abnormality, atraumatic.   Eyes:  Sclera anicteric   Nose: Nares normal   Throat: Lips, mucosa   Neck: Supple, symmetrical   Lungs:   Clear to auscultation bilaterally. Normal effort   Chest wall:  No tenderness or deformity.   Heart:  Regular rate and rhythm, S1, S2 normal,, I am able to reproduce significant tenderness over the left side of the thorax surrounding rib 11   Abdomen:   Soft, non-tender. Bowel sounds normal.    Extremities: Extremities normal, atraumatic, no cyanosis or edema.   Skin: Skin color, texture, turgor normal.    Neurologic: Alert but disoriented and confused confabulating during my interaction     Diagnostic Data:    CBC/Chem  Recent Labs   Lab 10/05/24  0835   WBC 13.3*   HGB 17.8*   MCV 95.2   .0*       Recent Labs   Lab 10/05/24  0835      K 4.4      CO2 27.0   BUN 15   CREATSERUM 1.06   *   CA 9.6       Recent Labs   Lab 10/05/24  0835   ALT 30   AST 35*   ALB 4.4       No results for input(s): \"TROP\" in the last 168 hours.    Additional Diagnostics: ECG: Atrial flutter    CXR: image personally reviewed  pulmonary vascular congestion noted    Radiology: CT CHEST+ABDOMEN+PELVIS(ALL CNTRST ONLY)(CPT=71260/54719)    Result Date: 10/5/2024  CONCLUSION:   1. Pulmonary edema.  No acute consolidation is seen.  Small bilateral pleural effusions.  2. No acute abnormality in the abdomen or pelvis.  No finding to explain left upper quadrant pain or leukocytosis.  No bowel obstruction.  Diverticulosis without evidence of acute diverticulitis.  No renal or ureteral stones or hydronephrosis.  3. Prostatomegaly and findings consistent with chronic bladder outlet obstruction.  4. Cardiomegaly.  5. Additional chronic or incidental findings are described in the body of this report.     Dictated by (CST): Bry Cardeans MD on 10/05/2024 at 11:55 AM     Finalized by (CST): Bry Cardenas MD on 10/05/2024 at 12:01 PM          XR CHEST AP/PA (1 VIEW) (CPT=71045)    Result Date: 10/5/2024  CONCLUSION:   Cardiomegaly with mild bilateral interstitial opacity which may reflect interstitial edema.  Mild patchy opacity left mid lung which could reflect atelectasis with or without superimposed pneumonia.    Dictated by (CST): Amarjit Christian MD on 10/05/2024 at 9:15 AM     Finalized by (CST): Amarjit Christian MD on 10/05/2024 at 9:16 AM             ASSESSMENT / PLAN:   Patient is a 89 year old male with PMH chronic systolic heart failure, Dementia living in [facility], generalized anxiety, hallucinations and delusions secondary to dementia, hyperlipidemia, pulmonary hypertension, atrial fibrillation on anticoagulation who presents from memory care with left-sided thorax pain.    Left-sided thorax pain appears to be reproducible on exam and worse with coughing or laughing I suspect this represents a rib contusion  -CT chest did not reveal any overt fractures  -No clear history of trauma has been reported  -For now we will trial lidocaine patch and scheduled Tylenol  -Incentive spirometer at bedside  -Tramadol as needed for  pain    2.  Alzheimer's dementia with behavioral disturbances  -Continue home donepezil citalopram quetiapine and lorazepam as needed    3.  Mild acute on chronic systolic CHF exacerbation  -IV Lasix 40 mg given in the emergency department patient does not appear volume overloaded on my exam although BNP is elevated  -Resume oral Lasix 40 twice daily starting tomorrow  Trend BMP  Continue home losartan carvedilol Lipitor  -Will repeat troponin to rule out ACS although patient has no symptoms consistent with acute coronary syndrome  -No clear etiology for leukocytosis will trend  -Noted mild elevation in total bilirubin likely related to CHF repeat tomorrow    4.  Atrial fibrillation/flutter  Currently rate controlled continue Coreg and Eliquis    DNR select discussed with daughter at bedside reviewed POLST    Admit under observation with anticipation of return to memory care tomorrow    FN:  - IVF:none   - Diet: Cardiac diet    DVT Prophy: Home anticoagulation  Lines: Peripheral IV    Dispo: pending clinical course    Outpatient records or previous hospital records reviewed.     Further recommendations pending patient's clinical course.  [facility] hospitalist to continue to follow patient while in house    Patient and/or patient's family given opportunity to ask questions and note understanding and agreeing with therapeutic plan as outlined    Thank You,  Irving Keenan DO    Hospitalist with [facility]  Answering Service number: [phone]

## 2024-10-05 NOTE — ED QUICK NOTES
Angel Luis arrived with EMS from [facility] for c/o pain to L sided ribs/L upper abdominal area. Oxygen saturations of 91% RA reported by SNF. Pt has a reported hx of dementia. On arrival to ED, he is both physically and verbally aggressive towards nursing staff. Attempted to assist Pt into gown but it is unsafe to do so at this time. Respirations are non-labored. Protection officers called to bedside in an attempt to diffuse Pt's behavior in preparation for IV/lab draw.

## 2024-10-05 NOTE — ED PROVIDER NOTES
Patient Seen in: [facility]         EMERGENCY DEPARTMENT NOTE    Dictated. Voice Transcription software has been utilized for this dictation (the reader should be aware that typographical errors are possible with voice transcription software and to please contact the dictating physician if there are questions.)         History     Chief Complaint   Patient presents with    Pain       There may be discrepancies from triage note.     HPI    History provided by EMS and patient.  Patient provides a poor history.  History of dementia.  Per EMS, patient comes from nursing facility for left upper quadrant abdominal pain/rib pain.  No trauma.  Patient combative.  Patient complains of left lower chest/left upper quadrant abdominal pain for an unknown period of time.  Denies shortness of breath.  No pleuritic pain.      No fevers, chills, nausea, vomiting, diarrhea, constipation, cough, cold symptoms, urinary complaints.  No headache, neck pain, neck stiffness, incontinence.  No changes in mentation, no changes in vision, no total/new extremity weakness, no total/new extremity paresthesia, no difficulty speaking.  No alleviating or exacerbating factors.  Denies orthopnea, pnd, clower extremity edema/asymmetry.   Denies trauma    History reviewed.   Past Medical History:    Acute on chronic systolic congestive heart failure (HCC)    Chronic combined systolic and diastolic congestive heart failure (HCC)    Chronic sinus bradycardia    Chronic systolic congestive heart failure (HCC)    Coronary artery disease involving native heart without angina pectoris, unspecified vessel or lesion type    Dementia (HCC)    Elevated hemoglobin (HCC)    History of myocardial infarction    Hypertensive CHF (congestive heart failure) (HCC)    Pulmonary hypertension (HCC)       History reviewed. History reviewed. No pertinent surgical history.      Medications :  (Not in a hospital admission)       No family history on file.    Smoking  Status:   Social History     Socioeconomic History    Marital status:    Tobacco Use    Smoking status: Never    Smokeless tobacco: Never   Vaping Use    Vaping status: Never Used   Substance and Sexual Activity    Alcohol use: No     Alcohol/week: 0.0 standard drinks of alcohol    Drug use: No       Review of Systems   Constitutional: Negative.    HENT: Negative.     Eyes: Negative.    Respiratory: Negative.  Negative for shortness of breath.    Cardiovascular:  Positive for chest pain.   Gastrointestinal:  Positive for abdominal pain.   Genitourinary: Negative.    Musculoskeletal: Negative.    Skin: Negative.    Neurological: Negative.    Endo/Heme/Allergies: Negative.    Psychiatric/Behavioral: Negative.     All other systems reviewed and are negative.    Pertinent positives as listed.  All other organ systems are reviewed and are negative.    Constitutional and vital signs reviewed.      Social History and Family History elements reviewed from today, pertinent positives to the presenting problem noted.    Physical Exam     ED Triage Vitals [10/05/24 0821]   BP (!) 152/108   Pulse 88   Resp 22   Temp 96.8 °F (36 °C)   Temp src Temporal   SpO2 98 %   O2 Device None (Room air)       All measures to prevent infection transmission during my interaction with the patient were taken. The patient was already wearing a droplet mask on my arrival to the room. Personal protective equipment including droplet mask, eye protection, and gloves were worn throughout the duration of the exam.  Handwashing was performed prior to and after the exam.  Stethoscope and any equipment used during my examination was cleaned with super sani-cloth germicidal wipes following the exam.     Physical Exam  Vitals and nursing note reviewed.   Constitutional:       General: He is not in acute distress.     Appearance: He is not ill-appearing or toxic-appearing.      Comments: Intermittently disgruntled   Cardiovascular:      Rate and  Rhythm: Normal rate and regular rhythm.   Pulmonary:      Effort: Pulmonary effort is normal. No respiratory distress.      Breath sounds: No stridor. No wheezing, rhonchi or rales.   Chest:      Chest wall: No tenderness.   Abdominal:      General: There is no distension.      Palpations: Abdomen is soft.      Tenderness: There is abdominal tenderness. There is no guarding or rebound.      Comments: Mild tenderness to left upper quadrant palpation   negative Akers sign, negative McBurney's point tenderness     Musculoskeletal:      Right lower leg: No edema.      Left lower leg: No edema.   Skin:     Capillary Refill: Capillary refill takes less than 2 seconds.   Neurological:      Mental Status: He is alert and oriented to person, place, and time.      Sensory: No sensory deficit.      Motor: No weakness.      Comments: Gross motor and sensory function intact symmetrically and bilaterally to upper extremities and lower extremities.   Psychiatric:         Mood and Affect: Mood normal.         Behavior: Behavior normal.           Review of prior notes in Care everywhere/Epic performed by myself:    -Patient had an echocardiogram February 2022 revealing ejection fraction of 45%  Patient had a chest x-ray completed April 2024 with no signs of pulmonary edema    ED Course     If labs obtained, they are personally reviewed by myself:     Labs Reviewed   CBC WITH DIFFERENTIAL WITH PLATELET - Abnormal; Notable for the following components:       Result Value    WBC 13.3 (*)     HGB 17.8 (*)     HCT 55.1 (*)     RDW-SD 55.6 (*)     RDW 15.8 (*)     .0 (*)     Immature Platelet Fraction 10.9 (*)     Neutrophil Absolute Prelim 9.85 (*)     Neutrophil Absolute 9.85 (*)     Monocyte Absolute 1.19 (*)     All other components within normal limits   COMP METABOLIC PANEL (14) - Abnormal; Notable for the following components:    Glucose 136 (*)     Calculated Osmolality 299 (*)     AST 35 (*)     Bilirubin, Total 3.6 (*)      All other components within normal limits   TROPONIN I HIGH SENSITIVITY - Abnormal; Notable for the following components:    Troponin I (High Sensitivity) 59 (*)     All other components within normal limits   PRO BETA NATRIURETIC PEPTIDE - Abnormal; Notable for the following components:    Pro-Beta Natriuretic Peptide 6,576 (*)     All other components within normal limits   TROPONIN I HIGH SENSITIVITY - Abnormal; Notable for the following components:    Troponin I (High Sensitivity) 61 (*)     All other components within normal limits   LIPASE - Normal   LIPID PANEL - Normal   ED/MRSA SCREEN BY PCR-CC - Normal   TROPONIN I HIGH SENSITIVITY   RAINBOW DRAW BLUE       If radiologic studies ordered during today's ER visit, my independent interpretation are seen directly below.  This is awaiting the radiologist's final interpretation.  CT chest/abd pelvis, independent interpretation of radiologic study completed by myself and awaiting formal radiologist interpretation: No pneumothorax, no abdominal perforation    Chest X-ray, independent interpretation completed by myself and awaiting formal radiology read:  No acute cardiopulmonary process, no obvious mass     Imaging Results read by radiology in ED: CT CHEST+ABDOMEN+PELVIS(ALL CNTRST ONLY)(CPT=71260/51625)    Result Date: 10/5/2024  CONCLUSION:   1. Pulmonary edema.  No acute consolidation is seen.  Small bilateral pleural effusions.  2. No acute abnormality in the abdomen or pelvis.  No finding to explain left upper quadrant pain or leukocytosis.  No bowel obstruction.  Diverticulosis without evidence of acute diverticulitis.  No renal or ureteral stones or hydronephrosis.  3. Prostatomegaly and findings consistent with chronic bladder outlet obstruction.  4. Cardiomegaly.  5. Additional chronic or incidental findings are described in the body of this report.     Dictated by (CST): Bry Cardenas MD on 10/05/2024 at 11:55 AM     Finalized by (CST): Byr Cardenas  MD on 10/05/2024 at 12:01 PM          XR CHEST AP/PA (1 VIEW) (CPT=71045)    Result Date: 10/5/2024  CONCLUSION:   Cardiomegaly with mild bilateral interstitial opacity which may reflect interstitial edema.  Mild patchy opacity left mid lung which could reflect atelectasis with or without superimposed pneumonia.    Dictated by (CST): Amarjit Christian MD on 10/05/2024 at 9:15 AM     Finalized by (CST): Amarjit Christian MD on 10/05/2024 at 9:16 AM               ED Medications Administered:   Medications   aspirin DR tab 81 mg (has no administration in time range)   atorvastatin (Lipitor) tab 10 mg (has no administration in time range)   carvedilol (Coreg) tab 6.25 mg (has no administration in time range)   escitalopram (Lexapro) tab 10 mg (has no administration in time range)   donepezil (Aricept) tab 10 mg (has no administration in time range)   apixaban (Eliquis) tab 5 mg (has no administration in time range)   furosemide (Lasix) tab 40 mg (has no administration in time range)   LORazepam (Ativan) tab 1 mg (has no administration in time range)   losartan (Cozaar) tab 25 mg (has no administration in time range)   QUEtiapine (SEROquel) tab 12.5 mg (has no administration in time range)   QUEtiapine (SEROquel) tab 25 mg (has no administration in time range)   traMADol (Ultram) tab 50 mg (has no administration in time range)   acetaminophen (Tylenol) tab 650 mg (has no administration in time range)   lidocaine-menthol 4-1 % patch 1 patch (has no administration in time range)   acetaminophen (Tylenol) tab 650 mg (has no administration in time range)     Or   HYDROcodone-acetaminophen (Norco) 5-325 MG per tab 1 tablet (has no administration in time range)     Or   HYDROcodone-acetaminophen (Norco) 5-325 MG per tab 2 tablet (has no administration in time range)   polyethylene glycol (PEG 3350) (Miralax) 17 g oral packet 17 g (has no administration in time range)   sennosides (Senokot) tab 17.2 mg (has no administration in  time range)   bisacodyl (Dulcolax) 10 MG rectal suppository 10 mg (has no administration in time range)   fleet enema (Fleet) rectal enema 133 mL (has no administration in time range)   ondansetron (Zofran) 4 MG/2ML injection 4 mg (has no administration in time range)   metoclopramide (Reglan) 5 mg/mL injection 5 mg (has no administration in time range)   iopamidol 76% (ISOVUE-370) injection for power injector (80 mL Intravenous Given 10/5/24 1127)   furosemide (Lasix) 10 mg/mL injection 40 mg (40 mg Intravenous Given 10/5/24 1308)           Vitals:    10/05/24 1200 10/05/24 1230 10/05/24 1332 10/05/24 1341   BP: (!) 176/97 (!) 175/94 (!) 162/108    BP Location:   Right arm    Pulse: 67 73 77    Resp: 20 23 20    Temp:   98.4 °F (36.9 °C)    TempSrc:   Oral    SpO2: 95% 94% 96%    Weight:    81 kg     *I personally reviewed and interpreted all ED vitals.    Pulse Ox interpretation by myself: 96%, Room air, Normal     Monitor Interpretation by myself:   atrial fibrillation, with ventricular rate of 88    If Ekg obtained during today's visit, it is independently interpreted by myself directly below:  EKG    Rate, intervals and axes as noted on EKG Report.  Rate: 95  Rhythm: Atrial Fibrillation  Reading: no st elevation, no st depression, normal t waves                Medical Record Review: I personally reviewed available prior medical records for any recent pertinent discharge summaries, testing, and procedures and reviewed those reports.      [facility]     Medical decision making/ED Course:   89-year-old male with history of dementia sent from memory care clinic for left upper quadrant abdominal pain and 1 episode of vomiting.  I suspect his symptoms are secondary to pulmonary edema.  Patient desaturated to 90% while at rest/sleeping however this is improved while awake and remained greater than 95% on room air.  Decreased air exchange noted however no tachypnea.  X-ray concerning for pulmonary edema.  BNP elevated at  6500.  Initial troponin slightly elevated and repeat troponin 2 hours thereafter remained stable at 61.  Patient with no active chest pain, shortness of breath.  Patient's only complaint is left upper quadrant abdominal pain with no focal abdominal tenderness on exam.  Slight leukocytosis of 13.3 noted.  Hemoglobin 17.8, elevated and likely secondary to dehydration.  Platelet count also slightly elevated-probably secondary to dehydration.  BMP normal.  Lipase normal.  Total bilirubin elevated at 3.6 however other LFTs normal.  Cholecystitis/choledocholithiasis seems less likely.  Patient with no focal abdominal tenderness and with equal distal pulses.  Given age, risk factors, equivocal chest x-ray concerning for pneumonia versus CHF, CT chest/abdomen/pelvis obtained revealing pulmonary edema, no obvious consolidation/acute life-threatening abdominal pathology.  Patient given Lasix here in the emergency department.  Case discussed with hospitalist who agreed with ER management, no further recommendations at this time  Status post evaluation of patient in the emergency room by hospitalist.      Atypical ACS, PE, cholecystitis, AAA, dissection, pancreatitis, mesenteric ischemia, volvulus, bowel obstruction, appendicitis, among other life-threatening medical conditions considered and seems unlikely given patient's history, exam, and appearance.  Pt agrees and is aware of plan.       Differential Diagnosis:  as listed above in medical decision making.   *Please note that in the presenting to the emergency department, illness/injury that poses a threat to life or function is considered during this patient's initial evaluation.    The complexity of this visit is therefore inherently more complex given the need to consider life threatening pathology prior to any other etiology for this patient's visit.    The differential diagnosis and medical decision above exemplify this rationale.       Medical Decision Making  Problems  Addressed:  Acute on chronic congestive heart failure, unspecified heart failure type (HCC): acute illness or injury  Hypoxia: acute illness or injury    Amount and/or Complexity of Data Reviewed  Independent Historian: EMS  External Data Reviewed: radiology and notes.  Labs: ordered. Decision-making details documented in ED Course.  Radiology: ordered and independent interpretation performed. Decision-making details documented in ED Course.  ECG/medicine tests: ordered and independent interpretation performed. Decision-making details documented in ED Course.  Discussion of management or test interpretation with external provider(s): Hospitalist    Risk  Decision regarding hospitalization.  Diagnosis or treatment significantly limited by social determinants of health.          ED Course as of 10/05/24 1354  ------------------------------------------------------------  Time: 10/05 0922  Comment: Improved blood pressure, no distress.  Initial hypertensive reading was likely secondary to his agitation  ------------------------------------------------------------  Time: 10/05 0956  Comment: Patient saturating 90% when sleeping, no tachypnea, no distress.  Blood pressure normalized.       Vitals:    10/05/24 1200 10/05/24 1230 10/05/24 1332 10/05/24 1341   BP: (!) 176/97 (!) 175/94 (!) 162/108    BP Location:   Right arm    Pulse: 67 73 77    Resp: 20 23 20    Temp:   98.4 °F (36.9 °C)    TempSrc:   Oral    SpO2: 95% 94% 96%    Weight:    81 kg             Complicating Factors: Significant medical problems that contribute to the complexity of this emergency room evaluation is listed above.    Condition upon leaving the department: Stable    Disposition and Plan     Clinical Impression:  1. Acute on chronic congestive heart failure, unspecified heart failure type (HCC)    2. Hypoxia        Disposition:  Admit    Medications Prescribed:  Current Discharge Medication List                Hospital Problems       Present on  Admission  Date Reviewed: 4/7/2022            ICD-10-CM Noted POA    * (Principal) Acute on chronic congestive heart failure, unspecified heart failure type (HCC) I50.9 10/5/2024 Unknown    Hypoxia R09.02 10/5/2024 Unknown

## 2024-10-05 NOTE — ED QUICK NOTES
Orders for admission, patient is aware of plan and ready to go upstairs. Any questions, please call ED RN Tiffany at extension [phone].     Patient Covid vaccination status: Unvaccinated     COVID Test Ordered in ED: None    COVID Suspicion at Admission: N/A    Running Infusions:  None    Mental Status/LOC at time of transport: alert and oriented to person only. Hx of declining dementia. Pt was initially physically and verbally aggressive upon arrival to ED but is now redirectable and friendly towards staff.     Other pertinent information:   CIWA score: N/A   NIH score:  N/A

## 2024-10-05 NOTE — ED INITIAL ASSESSMENT (HPI)
Angel Luis arrives via [facility] from [facility] memory care unit. Staff noticed patient wincing in pain, possibly to rib area. No known trauma or falls per medics. Patient with dementia, combative and upset upon arrival.

## 2024-10-05 NOTE — PLAN OF CARE
Patient is alert and oriented to self only, confused with dementia at baseline, per daughter walks one with a walker. Admission done, patient oriented to room and call light with help of daughter. PRN medication given for nausea. Repeat troponin taken, see results. All alarms on and fall precautions in place.     Problem: Patient Centered Care  Goal: Patient preferences are identified and integrated in the patient's plan of care  Description: Interventions:  - What would you like us to know as we care for you? I am from memory care.   - Provide timely, complete, and accurate information to patient/family  - Incorporate patient and family knowledge, values, beliefs, and cultural backgrounds into the planning and delivery of care  - Encourage patient/family to participate in care and decision-making at the level they choose  - Honor patient and family perspectives and choices  Outcome: Progressing     Problem: Patient/Family Goals  Goal: Patient/Family Long Term Goal  Description: Patient's Long Term Goal: I want to go home.    Interventions:  - discharge planning when appropriate.   - See additional Care Plan goals for specific interventions  Outcome: Progressing  Goal: Patient/Family Short Term Goal  Description: Patient's Short Term Goal: I want to stop vomiting.    Interventions:   - medications as needed  - See additional Care Plan goals for specific interventions  Outcome: Progressing

## 2024-10-05 NOTE — ED QUICK NOTES
Contacted Pt's daughter Bridget [phone] and updated her on plan of care. She states that her father has been reporting L sided discomfort since yesterday with an isolated episode of vomiting this morning. States his dementia has been declining but that he is typically very kind-natured and that aggressiveness is out of the ordinary for him. States she will come to the ED and sit with him.

## 2024-10-06 LAB
ALBUMIN SERPL-MCNC: 4.1 G/DL (ref 3.2–4.8)
ALP LIVER SERPL-CCNC: 89 U/L
ALT SERPL-CCNC: 19 U/L
ANION GAP SERPL CALC-SCNC: 7 MMOL/L (ref 0–18)
AST SERPL-CCNC: 21 U/L (ref ?–34)
BILIRUB DIRECT SERPL-MCNC: 0.5 MG/DL (ref ?–0.3)
BILIRUB SERPL-MCNC: 3.9 MG/DL (ref 0.2–0.9)
BUN BLD-MCNC: 20 MG/DL (ref 9–23)
BUN/CREAT SERPL: 18.5 (ref 10–20)
CALCIUM BLD-MCNC: 9.4 MG/DL (ref 8.7–10.4)
CHLORIDE SERPL-SCNC: 106 MMOL/L (ref 98–112)
CO2 SERPL-SCNC: 28 MMOL/L (ref 21–32)
CREAT BLD-MCNC: 1.08 MG/DL
DEPRECATED RDW RBC AUTO: 53.6 FL (ref 35.1–46.3)
EGFRCR SERPLBLD CKD-EPI 2021: 64 ML/MIN/1.73M2 (ref 60–?)
ERYTHROCYTE [DISTWIDTH] IN BLOOD BY AUTOMATED COUNT: 15.8 % (ref 11–15)
GLUCOSE BLD-MCNC: 101 MG/DL (ref 70–99)
HCT VFR BLD AUTO: 51 %
HGB BLD-MCNC: 17 G/DL
MAGNESIUM SERPL-MCNC: 1.8 MG/DL (ref 1.6–2.6)
MCH RBC QN AUTO: 30.8 PG (ref 26–34)
MCHC RBC AUTO-ENTMCNC: 33.3 G/DL (ref 31–37)
MCV RBC AUTO: 92.4 FL
OSMOLALITY SERPL CALC.SUM OF ELEC: 295 MOSM/KG (ref 275–295)
PLATELET # BLD AUTO: 367 10(3)UL (ref 150–450)
POTASSIUM SERPL-SCNC: 4.3 MMOL/L (ref 3.5–5.1)
PROT SERPL-MCNC: 6.8 G/DL (ref 5.7–8.2)
RBC # BLD AUTO: 5.52 X10(6)UL
SODIUM SERPL-SCNC: 141 MMOL/L (ref 136–145)
WBC # BLD AUTO: 12.9 X10(3) UL (ref 4–11)

## 2024-10-06 PROCEDURE — 97162 PT EVAL MOD COMPLEX 30 MIN: CPT

## 2024-10-06 PROCEDURE — 97535 SELF CARE MNGMENT TRAINING: CPT

## 2024-10-06 PROCEDURE — 83735 ASSAY OF MAGNESIUM: CPT | Performed by: INTERNAL MEDICINE

## 2024-10-06 PROCEDURE — 97530 THERAPEUTIC ACTIVITIES: CPT

## 2024-10-06 PROCEDURE — 80076 HEPATIC FUNCTION PANEL: CPT | Performed by: INTERNAL MEDICINE

## 2024-10-06 PROCEDURE — 97166 OT EVAL MOD COMPLEX 45 MIN: CPT

## 2024-10-06 PROCEDURE — 85027 COMPLETE CBC AUTOMATED: CPT | Performed by: INTERNAL MEDICINE

## 2024-10-06 PROCEDURE — 80048 BASIC METABOLIC PNL TOTAL CA: CPT | Performed by: INTERNAL MEDICINE

## 2024-10-06 RX ORDER — TRAMADOL HYDROCHLORIDE 50 MG/1
50 TABLET ORAL 2 TIMES DAILY
Status: DISCONTINUED | OUTPATIENT
Start: 2024-10-06 | End: 2024-10-08

## 2024-10-06 RX ORDER — MAGNESIUM OXIDE 400 MG/1
400 TABLET ORAL ONCE
Status: COMPLETED | OUTPATIENT
Start: 2024-10-06 | End: 2024-10-06

## 2024-10-06 RX ORDER — DOCUSATE SODIUM 100 MG/1
100 CAPSULE, LIQUID FILLED ORAL 2 TIMES DAILY
Status: DISCONTINUED | OUTPATIENT
Start: 2024-10-06 | End: 2024-10-08

## 2024-10-06 NOTE — OCCUPATIONAL THERAPY NOTE
OCCUPATIONAL THERAPY EVALUATION - INPATIENT     Room Number: 303/303-A  Evaluation Date: 10/6/2024  Type of Evaluation: Initial  Presenting Problem: left side thorax - pain (dementia)    Physician Order: IP Consult to Occupational Therapy  Reason for Therapy: ADL/IADL Dysfunction and Discharge Planning    OCCUPATIONAL THERAPY ASSESSMENT   Patient is a 89 year old male admitted 10/5/2024 for left side thorax - pain (dementia).  Prior to admission, patient's baseline is using RW at [facility], unclear ADL baseline as pt poor historian.  Patient is currently functioning below baseline with ADLs and functional mobility.  Patient is requiring Max - dependent assist as a result of the following impairments: pain, decreased sitting balance, decreased aerobic capacity with fatigue. Occupational Therapy will continue to follow for duration of hospitalization.    Patient will benefit from continued skilled OT Services to promote return to prior level of function and safety with continuous assistance and gradual rehabilitative therapy.    PLAN DURING HOSPITALIZATION  OT Device Recommendations: TBD  OT Treatment Plan: Balance activities;Energy conservation/work simplification techniques;ADL training;Functional transfer training;Endurance training;Equipment eval/education;Compensatory technique education     OCCUPATIONAL THERAPY MEDICAL/SOCIAL HISTORY   Problem List  Principal Problem:    Acute on chronic congestive heart failure, unspecified heart failure type ([facility])  Active Problems:    Hypoxia    HOME SITUATION  Type of Home: -- ([facility])  Lives With: Staff 24 hours  Patient Regularly Uses: Rolling walker    SUBJECTIVE  \"My side hurts.. not all the time but suddenly - OUCH!\"  Pt clutching left side of abdomen/ belly     OCCUPATIONAL THERAPY EXAMINATION      OBJECTIVE  Precautions: Abdominal protective strategies; Bed/chair alarm  Fall Risk: High fall risk      PAIN ASSESSMENT  Rating: Unable to rate  Location: left  belly/abdomen (intermittent shoots of pain, increased with movement)      ACTIVITY TOLERANCE  Pulse: 57                      O2 SATURATIONS  Oxygen Therapy  SPO2% on Oxygen at Rest: 92  At rest oxygen flow (liters per minute): 2.5  SPO2% Ambulation on Oxygen: 87 (while seated edge of bed, improved after returning to supine - pt w/ difficulty completing pursed lip breathing due to pain in side)  Ambulation oxygen flow (liters per minute): 2.5    COGNITION  Impaired  Hx of dementia  Followed one-step cues with time, repetition as needed  Alert to self     RANGE OF MOTION   Upper extremity ROM is within functional limits     STRENGTH ASSESSMENT  Upper extremity strength is within functional limits     COORDINATION  Gross Motor: WFL   Fine Motor: WFL     ACTIVITIES OF DAILY LIVING ASSESSMENT  AM-PAC ‘6-Clicks’ Inpatient Daily Activity Short Form  How much help from another person does the patient currently need…  -   Putting on and taking off regular lower body clothing?: Total  -   Bathing (including washing, rinsing, drying)?: Total  -   Toileting, which includes using toilet, bedpan or urinal? : A Lot  -   Putting on and taking off regular upper body clothing?: A Little  -   Taking care of personal grooming such as brushing teeth?: A Little  -   Eating meals?: A Little    AM-PAC Score:  Score: 13  Approx Degree of Impairment: 63.03%  Standardized Score (AM-PAC Scale): 32.03  CMS Modifier (G-Code): CL    FUNCTIONAL TRANSFER ASSESSMENT  Sit to Stand: Edge of Bed  Edge of Bed: Not Tested    BED MOBILITY  Supine to Sit : Maximum Assist (x2)    BALANCE ASSESSMENT  Static Sitting: Moderate Assist (bouts of Mod , improves to close CGA w/ cues for hand placement and to touch toes to floor)    FUNCTIONAL ADL ASSESSMENT  Grooming Seated: Moderate Assist  LB Dressing Seated: Maximum Assist    Skilled Therapy Provided: RN cleared pt for participation in occupational therapy session, which was completed in collaboration with PT.  Upon arrival, pt was supine in bed and agreeable to activity. No family was present during session. Pt benefited from additional time, verbal cues to maximize participation.    Pt was alert and followed one-step cues, pleasant. Pt reported intermittent pain, chauncey with movement. Pt also with noted limited right arm AROM at shoulder , not actively using often or of own volition -- RN AWARE.  Pt required two person assist to come to sit edge of bed, intermittent Mod A to maintain seated balance during attempt at seated grooming. Pt noted with decreased awareness to urine on sheets - return to supine for bed level jamey care  - MAX A to roll due to pain.       EDUCATION PROVIDED  Patient Education : Role of Occupational Therapy; Plan of Care; Functional Transfer Techniques (ADL re-training, balance, safety)  Patient's Response to Education: Returned Demonstration; Verbalized Understanding    The patient's Approx Degree of Impairment: 63.03% has been calculated based on documentation in the [facility] '6 clicks' Inpatient Daily Activity Short Form.  Research supports that patients with this level of impairment may benefit from rehab.  Final disposition will be made by interdisciplinary medical team.     Patient End of Session: Up in chair;Needs met;Call light within reach;RN aware of session/findings    OT Goals  Patients self stated goal is: did not state     Patient will complete functional transfer with SBA  Comment:     Patient will complete EOB sitting - ADL tasks - with SBA  Comment:      Comment:     Comment:          Goals  on:  10/20/24  Frequency: 3x/w    Patient Evaluation Complexity Level:   Occupational Profile/Medical History MODERATE - Expanded review of history including review of medical or therapy record   Specific performance deficits impacting engagement in ADL/IADL MODERATE  3 - 5 performance deficits   Client Assessment/Performance Deficits MODERATE - Comorbidities and min to mod modifications of  tasks    Clinical Decision Making MODERATE - Analysis of occupational profile, detailed assessments, several treatment options    Overall Complexity MODERATE     OT Session Time: 25 minutes  Self-Care Home Management: 15 minutes  Therapeutic Activity: 10 minutes

## 2024-10-06 NOTE — PHYSICAL THERAPY NOTE
PHYSICAL THERAPY EVALUATION - INPATIENT     Room Number: 303/303-A  Evaluation Date: 10/6/2024  Type of Evaluation: Initial   Physician Order: PT Eval and Treat    Presenting Problem: Acue on chronic congestive heart failure, new onset L abdomen/flank pain  Co-Morbidities : Dementia  Reason for Therapy: Mobility Dysfunction and Discharge Planning    PHYSICAL THERAPY ASSESSMENT   Patient is a 89 year old male admitted 10/5/2024 for Acue on chronic congestive heart failure, new onset L abdomen/flank pain.  Prior to admission, patient's baseline is per EMR pt was ambulating with 1 assist and RW at [facility].  Patient is currently functioning below baseline with bed mobility, transfers, and gait.  Patient is requiring maximum assist and 2 assist  as a result of the following impairments: decreased functional strength, pain, impaired sitting balance, decreased muscular endurance, cognitive deficits (disoriented, confused, trouble following commands), and increased O2 needs from baseline.  Physical Therapy will continue to follow for duration of hospitalization.    Patient will benefit from continued skilled PT Services to promote return to prior level of function and safety with continuous assistance and gradual rehabilitative therapy .    PLAN DURING HOSPITALIZATION  Nursing Mobility Recommendation : Lift Equipment  PT Device Recommendation: Rolling walker;Mechanical lift (TBD)  PT Treatment Plan: Bed mobility;Body mechanics;Endurance;Energy conservation;Patient education;Gait training;Range of motion;Strengthening;Transfer training;Balance training  Rehab Potential : Fair  Frequency (Obs): 3-5x/week     PHYSICAL THERAPY MEDICAL/SOCIAL HISTORY   History related to current admission: Hx of dementia     Problem List  Principal Problem:    Acute on chronic congestive heart failure, unspecified heart failure type ([facility])  Active Problems:    Hypoxia      HOME SITUATION  Type of Home:  ([facility])                         Lives With: Staff 24 hours        Patient Regularly Uses: Rolling walker     Prior Level of [place]: Per EMR pt was ambulating with walker and assist of 1    SUBJECTIVE  \"Ouch, it just hurts right here.\"    PHYSICAL THERAPY EXAMINATION   OBJECTIVE  Precautions: Abdominal protective strategies;Bed/chair alarm  Fall Risk: High fall risk    WEIGHT BEARING RESTRICTION       PAIN ASSESSMENT  Rating: Unable to rate  Location: L abdomen/flank  Management Techniques: Repositioning (pillow/abdominal bracing strategies)    COGNITION  Overall Cognitive Status:  Impaired  Orientation Level:  oriented to person, disoriented to place, disoriented to time, and disoriented to situation  Memory:  decreased long term memory and decreased short term memory  Following Commands:  follows one step commands with increased time and follows one step commands with repetition    RANGE OF MOTION AND STRENGTH ASSESSMENT  Upper extremity ROM and strength are limited to R arm - painful/guarding noted with bed mobility  Lower extremity ROM is WFL for bed mobility/sitting at EOB  Lower extremity strength with functional strength deficits to parish LE    BALANCE  Static Sitting: Poor +  Dynamic Sitting: Poor  Static Standing: Not tested  Dynamic Standing: Not tested    ADDITIONAL TESTS                                    NEUROLOGICAL FINDINGS                      ACTIVITY TOLERANCE                         O2 WALK  Oxygen Therapy  SPO2% on Oxygen at Rest: 87  At rest oxygen flow (liters per minute): 2.5    AM-PAC '6-Clicks' INPATIENT SHORT FORM - BASIC MOBILITY  How much difficulty does the patient currently have...  Patient Difficulty: Turning over in bed (including adjusting bedclothes, sheets and blankets)?: A Lot   Patient Difficulty: Sitting down on and standing up from a chair with arms (e.g., wheelchair, bedside commode, etc.): Unable   Patient Difficulty: Moving from lying on back to sitting on the side of the bed?: A Lot   How much  help from another person does the patient currently need...   Help from Another: Moving to and from a bed to a chair (including a wheelchair)?: Total   Help from Another: Need to walk in hospital room?: Total   Help from Another: Climbing 3-5 steps with a railing?: Total     AM-PAC Score:  Raw Score: 8   Approx Degree of Impairment: 86.62%   Standardized Score (AM-PAC Scale): 28.58   CMS Modifier (G-Code): CM    FUNCTIONAL ABILITY STATUS  Functional Mobility/Gait Assessment  Gait Assistance: Not tested  Rolling: maximum assist and 2 people  Supine to Sit: maximum assist and 2 people  Sit to Supine: maximum assist and 2 people    Exercise/Education Provided:  Bed mobility  Body mechanics  Energy conservation  Functional activity tolerated  Posture  Safety precautions, bracing/abdominal strategies    Skilled Therapy Provided: Pt received supine in bed, agreeable to PT evaluation. Pt noted to be soiled in foul smelling urine, sheets and bedding very wet. RN/PCT notified - external catheter not placed correctly and wall suction not working correctly upon PT arrival. Pt requires Max A of 2 people for supine <> sit, sat at EOB for ~6 minutes with Min/Mod A - posterior LOB without support. Pt assisted with rolling R/L once returned to bed to place new linens/remove soiled linens. PCT in room at end of session assisting with placing new catheter. RN updated.     The patient's Approx Degree of Impairment: 86.62% has been calculated based on documentation in the [facility] '6 clicks' Inpatient Basic Mobility Short Form.  Research supports that patients with this level of impairment may benefit from LTC however given pt was ambulatory with assist of 1 prior to admission pt may benefit from gradual rehabilitation to restore baseline mobility and reduce caregiver burden.  Final disposition will be made by interdisciplinary medical team.    Patient End of Session: In bed;Needs met;Call light within reach;RN aware of  session/findings;All patient questions and concerns addressed;Alarm set    CURRENT GOALS  Goals to be met by: 10/20/24  Patient Goal Patient's self-stated goal is: none stated   Goal #1 Patient is able to demonstrate supine - sit EOB @ level: minimum assistance     Goal #1   Current Status    Goal #2 Patient is able to demonstrate transfers Sit to/from Stand at assistance level: minimum assistance with walker - rolling     Goal #2  Current Status    Goal #3 Patient is able to ambulate 15 feet with assist device: walker - rolling at assistance level: minimum assistance   Goal #3   Current Status    Goal #5 Patient to demonstrate independence with home activity/exercise instructions provided to patient in preparation for discharge.   Goal #5   Current Status    Goal #6    Goal #6  Current Status      Patient Evaluation Complexity Level:  History High - 3 or more personal factors and/or co-morbidities   Examination of body systems Moderate - addressing a total of 3 or more elements   Clinical Presentation  Moderate - Evolving   Clinical Decision Making  Moderate Complexity       Therapeutic Activity:  25 minutes  Mod Complex PT Eval

## 2024-10-06 NOTE — PLAN OF CARE
PtDeacon Angel Luis is A&Ox 1. Lives at [facility]. Patient is a max assist, turn Q2 hours. Patient is experiencing pain on L lateral abdominal side, pt is receiving scheduled tylenol and tramadol. Given PRN norco. Incontinent of bowel and bladder. Primofit. Seen by PT/OT, see note in chart. Call light within reach, fall precautions maintained.     Plan is wean off oxygen and pain management.    Problem: Patient Centered Care  Goal: Patient preferences are identified and integrated in the patient's plan of care  Description: Interventions:  - What would you like us to know as we care for you? I am from [facility]  - Provide timely, complete, and accurate information to patient/family  - Incorporate patient and family knowledge, values, beliefs, and cultural backgrounds into the planning and delivery of care  - Encourage patient/family to participate in care and decision-making at the level they choose  - Honor patient and family perspectives and choices  Outcome: Progressing     Problem: SAFETY ADULT - FALL  Goal: Free from fall injury  Description: INTERVENTIONS:  - Assess pt frequently for physical needs  - Identify cognitive and physical deficits and behaviors that affect risk of falls.  - [place] fall precautions as indicated by assessment.  - Educate pt/family on patient safety including physical limitations  - Instruct pt to call for assistance with activity based on assessment  - Modify environment to reduce risk of injury  - Provide assistive devices as appropriate  - Consider OT/PT consult to assist with strengthening/mobility  - Encourage toileting schedule  Outcome: Progressing     Problem: DISCHARGE PLANNING  Goal: Discharge to home or other facility with appropriate resources  Description: INTERVENTIONS:  - Identify barriers to discharge w/pt and caregiver  - Include patient/family/discharge partner in discharge planning  - Arrange for needed discharge resources and transportation as  appropriate  - Identify discharge learning needs (meds, wound care, etc)  - Arrange for interpreters to assist at discharge as needed  - Consider post-discharge preferences of patient/family/discharge partner  - Complete POLST form as appropriate  - Assess patient's ability to be responsible for managing their own health  - Refer to Case Management Department for coordinating discharge planning if the patient needs post-hospital services based on physician/LIP order or complex needs related to functional status, cognitive ability or social support system  Outcome: Progressing     Problem: CARDIOVASCULAR - ADULT  Goal: Maintains optimal cardiac output and hemodynamic stability  Description: INTERVENTIONS:  - Monitor vital signs, rhythm, and trends  - Monitor for bleeding, hypotension and signs of decreased cardiac output  - Evaluate effectiveness of vasoactive medications to optimize hemodynamic stability  - Monitor arterial and/or venous puncture sites for bleeding and/or hematoma  - Assess quality of pulses, skin color and temperature  - Assess for signs of decreased coronary artery perfusion - ex. Angina  - Evaluate fluid balance, assess for edema, trend weights  Outcome: Progressing  Goal: Absence of cardiac arrhythmias or at baseline  Description: INTERVENTIONS:  - Continuous cardiac monitoring, monitor vital signs, obtain 12 lead EKG if indicated  - Evaluate effectiveness of antiarrhythmic and heart rate control medications as ordered  - Initiate emergency measures for life threatening arrhythmias  - Monitor electrolytes and administer replacement therapy as ordered  Outcome: Progressing     Problem: RESPIRATORY - ADULT  Goal: Achieves optimal ventilation and oxygenation  Description: INTERVENTIONS:  - Assess for changes in respiratory status  - Assess for changes in mentation and behavior  - Position to facilitate oxygenation and minimize respiratory effort  - Oxygen supplementation based on oxygen saturation  or ABGs  - Provide Smoking Cessation handout, if applicable  - Encourage broncho-pulmonary hygiene including cough, deep breathe, Incentive Spirometry  - Assess the need for suctioning and perform as needed  - Assess and instruct to report SOB or any respiratory difficulty  - Respiratory Therapy support as indicated  - Manage/alleviate anxiety  - Monitor for signs/symptoms of CO2 retention  Outcome: Progressing     Problem: METABOLIC/FLUID AND ELECTROLYTES - ADULT  Goal: Electrolytes maintained within normal limits  Description: INTERVENTIONS:  - Monitor labs and rhythm and assess patient for signs and symptoms of electrolyte imbalances  - Administer electrolyte replacement as ordered  - Monitor response to electrolyte replacements, including rhythm and repeat lab results as appropriate  - Fluid restriction as ordered  - Instruct patient on fluid and nutrition restrictions as appropriate  Outcome: Progressing

## 2024-10-06 NOTE — PROGRESS NOTES
[facility] and [facility] Hospitalist Progress Note     CC: Hospital Follow up    PCP: Oscar Pittman MD       Assessment/Plan:     Principal Problem:    Acute on chronic congestive heart failure, unspecified heart failure type (HCC)  Active Problems:    Hypoxia      Patient is a 89 year old male with PMH chronic systolic heart failure, Dementia living in [facility] care, generalized anxiety, hallucinations and delusions secondary to dementia, hyperlipidemia, pulmonary hypertension, atrial fibrillation on anticoagulation who presents from memory care with left-sided thorax pain.     Left-sided thorax pain appears to be reproducible on exam and worse with coughing or laughing I suspect this represents a rib contusion  -CT chest did not reveal any overt fractures  -No clear history of trauma has been reported  -Pain persists , will trial scheduled tramadol 50 mg BID , continue lidocaine and tylenol   -Incentive spirometer at bedside  -Discussed with daughter on the phone    -Could consider muscle relaxer   Wean O2 as able, is currently on 2 L     2.  Alzheimer's dementia with behavioral disturbances  -Continue home donepezil citalopram quetiapine and lorazepam as needed     3.  Mild acute on chronic systolic CHF exacerbation  -Resume oral lasix , S/P IV lasix in ED   Trend BMP  Continue home losartan carvedilol Lipitor  -Will repeat troponin to rule out ACS although patient has no symptoms consistent with acute coronary syndrome  -No clear etiology for leukocytosis will trend  -Noted mild elevation in total bilirubin, seems chronic, no further work-up at this time      4.  Atrial fibrillation/flutter  Currently rate controlled continue Coreg and Eliquis     DNR select discussed with daughter at bedside reviewed POLST     Admit under observation with anticipation of return to memory care tomorrow     FN:  - IVF:none   - Diet: Cardiac diet       Questions/concerns were discussed with patient and/or family by  bedside.    Thank You,  Irving Keenan, DO    Hospitalist with [facility]     Subjective:     Ongoing pain in left thorax with deep breaths or turning     OBJECTIVE:    Blood pressure (!) 155/95, pulse 62, temperature 98.3 °F (36.8 °C), temperature source Axillary, resp. rate 20, weight 177 lb 11.2 oz (80.6 kg), SpO2 91%.    Temp:  [98.1 °F (36.7 °C)-98.5 °F (36.9 °C)] 98.3 °F (36.8 °C)  Pulse:  [55-77] 62  Resp:  [15-23] 20  BP: (136-176)/() 155/95  SpO2:  [91 %-96 %] 91 %      Intake/Output:    Intake/Output Summary (Last 24 hours) at 10/6/2024 1159  Last data filed at 10/6/2024 0900  Gross per 24 hour   Intake 590 ml   Output 1300 ml   Net -710 ml       Last 3 Weights   10/06/24 0607 177 lb 11.2 oz (80.6 kg)   10/05/24 1341 178 lb 8 oz (81 kg)   10/05/24 0821 170 lb (77.1 kg)   06/16/24 0906 180 lb (81.6 kg)   04/07/22 1022 192 lb (87.1 kg)       Exam   Gen: No acute distress, alert and oriented x1-2  Pulm: Lungs clear, normal respiratory effort, pain in left lower thorax   CV: Heart with regular rate and rhythm, no peripheral edema        Data Review:       Labs:     Recent Labs   Lab 10/05/24  0835 10/06/24  0638   RBC 5.79 5.52   HGB 17.8* 17.0   HCT 55.1* 51.0   MCV 95.2 92.4   MCH 30.7 30.8   MCHC 32.3 33.3   RDW 15.8* 15.8*   NEPRELIM 9.85*  --    WBC 13.3* 12.9*   .0* 367.0         Recent Labs   Lab 10/05/24  0835 10/06/24  0638   * 101*   BUN 15 20   CREATSERUM 1.06 1.08   EGFRCR 66 64   CA 9.6 9.4    141   K 4.4 4.3    106   CO2 27.0 28.0       Recent Labs   Lab 10/05/24  0835 10/06/24  0638   ALT 30 19   AST 35* 21   ALB 4.4 4.1         Imaging:  CT CHEST+ABDOMEN+PELVIS(ALL CNTRST ONLY)(CPT=71260/93229)    Result Date: 10/5/2024  CONCLUSION:   1. Pulmonary edema.  No acute consolidation is seen.  Small bilateral pleural effusions.  2. No acute abnormality in the abdomen or pelvis.  No finding to explain left upper quadrant pain or leukocytosis.  No bowel  obstruction.  Diverticulosis without evidence of acute diverticulitis.  No renal or ureteral stones or hydronephrosis.  3. Prostatomegaly and findings consistent with chronic bladder outlet obstruction.  4. Cardiomegaly.  5. Additional chronic or incidental findings are described in the body of this report.     Dictated by (CST): Bry Cardenas MD on 10/05/2024 at 11:55 AM     Finalized by (CST): Bry Cardenas MD on 10/05/2024 at 12:01 PM          XR CHEST AP/PA (1 VIEW) (CPT=71045)    Result Date: 10/5/2024  CONCLUSION:   Cardiomegaly with mild bilateral interstitial opacity which may reflect interstitial edema.  Mild patchy opacity left mid lung which could reflect atelectasis with or without superimposed pneumonia.    Dictated by (CST): Amarjit Christian MD on 10/05/2024 at 9:15 AM     Finalized by (CST): Amarjit Christian MD on 10/05/2024 at 9:16 AM             Meds:      traMADol  50 mg Oral BID    docusate sodium  100 mg Oral BID    aspirin  81 mg Oral Daily    atorvastatin  10 mg Oral Nightly    carvedilol  6.25 mg Oral BID    escitalopram  10 mg Oral Daily    donepezil  10 mg Oral Nightly    apixaban  5 mg Oral BID    furosemide  40 mg Oral BID    losartan  25 mg Oral BID    QUEtiapine  25 mg Oral TID    acetaminophen  650 mg Oral q6h    lidocaine-menthol  1 patch Transdermal Daily         LORazepam    QUEtiapine    acetaminophen **OR** HYDROcodone-acetaminophen **OR** HYDROcodone-acetaminophen    polyethylene glycol (PEG 3350)    sennosides    bisacodyl    fleet enema    ondansetron    metoclopramide

## 2024-10-06 NOTE — PLAN OF CARE
O2 2 l n/c- on and off. Saturating 92% on room air. Able to follow command. P.o lasix continues. On schedule tylenol (see MAR) Stable vital signs.  PT/OT consult.    Problem: Patient Centered Care  Goal: Patient preferences are identified and integrated in the patient's plan of care  Description: Interventions:  - What would you like us to know as we care for you? Lives at [facility].  - Provide timely, complete, and accurate information to patient/family  - Incorporate patient and family knowledge, values, beliefs, and cultural backgrounds into the planning and delivery of care  - Encourage patient/family to participate in care and decision-making at the level they choose  - Honor patient and family perspectives and choices  Outcome: Progressing     Problem: SAFETY ADULT - FALL  Goal: Free from fall injury  Description: INTERVENTIONS:  - Assess pt frequently for physical needs  - Identify cognitive and physical deficits and behaviors that affect risk of falls.  - [place] fall precautions as indicated by assessment.  - Educate pt/family on patient safety including physical limitations  - Instruct pt to call for assistance with activity based on assessment  - Modify environment to reduce risk of injury  - Provide assistive devices as appropriate  - Consider OT/PT consult to assist with strengthening/mobility  - Encourage toileting schedule  Outcome: Progressing     Problem: DISCHARGE PLANNING  Goal: Discharge to home or other facility with appropriate resources  Description: INTERVENTIONS:  - Identify barriers to discharge w/pt and caregiver  - Include patient/family/discharge partner in discharge planning  - Arrange for needed discharge resources and transportation as appropriate  - Identify discharge learning needs (meds, wound care, etc)  - Arrange for interpreters to assist at discharge as needed  - Consider post-discharge preferences of patient/family/discharge partner  - Complete POLST form as  appropriate  - Assess patient's ability to be responsible for managing their own health  - Refer to Case Management Department for coordinating discharge planning if the patient needs post-hospital services based on physician/LIP order or complex needs related to functional status, cognitive ability or social support system  Outcome: Progressing     Problem: CARDIOVASCULAR - ADULT  Goal: Maintains optimal cardiac output and hemodynamic stability  Description: INTERVENTIONS:  - Monitor vital signs, rhythm, and trends  - Monitor for bleeding, hypotension and signs of decreased cardiac output  - Evaluate effectiveness of vasoactive medications to optimize hemodynamic stability  - Monitor arterial and/or venous puncture sites for bleeding and/or hematoma  - Assess quality of pulses, skin color and temperature  - Assess for signs of decreased coronary artery perfusion - ex. Angina  - Evaluate fluid balance, assess for edema, trend weights  Outcome: Progressing  Goal: Absence of cardiac arrhythmias or at baseline  Description: INTERVENTIONS:  - Continuous cardiac monitoring, monitor vital signs, obtain 12 lead EKG if indicated  - Evaluate effectiveness of antiarrhythmic and heart rate control medications as ordered  - Initiate emergency measures for life threatening arrhythmias  - Monitor electrolytes and administer replacement therapy as ordered  Outcome: Progressing     Problem: RESPIRATORY - ADULT  Goal: Achieves optimal ventilation and oxygenation  Description: INTERVENTIONS:  - Assess for changes in respiratory status  - Assess for changes in mentation and behavior  - Position to facilitate oxygenation and minimize respiratory effort  - Oxygen supplementation based on oxygen saturation or ABGs  - Provide Smoking Cessation handout, if applicable  - Encourage broncho-pulmonary hygiene including cough, deep breathe, Incentive Spirometry  - Assess the need for suctioning and perform as needed  - Assess and instruct to  report SOB or any respiratory difficulty  - Respiratory Therapy support as indicated  - Manage/alleviate anxiety  - Monitor for signs/symptoms of CO2 retention  Outcome: Progressing     Problem: METABOLIC/FLUID AND ELECTROLYTES - ADULT  Goal: Electrolytes maintained within normal limits  Description: INTERVENTIONS:  - Monitor labs and rhythm and assess patient for signs and symptoms of electrolyte imbalances  - Administer electrolyte replacement as ordered  - Monitor response to electrolyte replacements, including rhythm and repeat lab results as appropriate  - Fluid restriction as ordered  - Instruct patient on fluid and nutrition restrictions as appropriate  Outcome: Progressing

## 2024-10-07 ENCOUNTER — APPOINTMENT (OUTPATIENT)
Dept: GENERAL RADIOLOGY | Facility: HOSPITAL | Age: 89
End: 2024-10-07
Attending: INTERNAL MEDICINE
Payer: MEDICARE

## 2024-10-07 LAB — MAGNESIUM SERPL-MCNC: 1.8 MG/DL (ref 1.6–2.6)

## 2024-10-07 PROCEDURE — 83735 ASSAY OF MAGNESIUM: CPT | Performed by: INTERNAL MEDICINE

## 2024-10-07 PROCEDURE — 71045 X-RAY EXAM CHEST 1 VIEW: CPT | Performed by: INTERNAL MEDICINE

## 2024-10-07 RX ORDER — MAGNESIUM OXIDE 400 MG/1
400 TABLET ORAL ONCE
Status: COMPLETED | OUTPATIENT
Start: 2024-10-07 | End: 2024-10-07

## 2024-10-07 RX ORDER — FUROSEMIDE 10 MG/ML
40 INJECTION INTRAMUSCULAR; INTRAVENOUS ONCE
Status: COMPLETED | OUTPATIENT
Start: 2024-10-07 | End: 2024-10-07

## 2024-10-07 RX ORDER — ERYTHROMYCIN 5 MG/G
1 OINTMENT OPHTHALMIC EVERY 8 HOURS SCHEDULED
Status: DISCONTINUED | OUTPATIENT
Start: 2024-10-07 | End: 2024-10-08

## 2024-10-07 NOTE — CDS QUERY
Dear Dr Lofton,    Can the  Atrial Fibrillation be further specified,    (x  ) Chronic     (  ) Permanent    (  ) Paroxysmal    (  ) Persistent     (  ) Other: Please specify : _____________________     _________________________________________________________________________    Clinical Indicator:     10/5  H&P:  -- \"  Atrial fibrillation/flutter -Currently rate controlled continue Coreg and Eliquis\"     10/5   EKG: --Atrial flutter with variable A-V block with premature ventricular or aberrantly conducted complexes      Risk factor: Advanced Age - 89 y/o Male, Chronic Systolic Heart failure , Pulmonary Hypertension      Treatment:  -- Eliquis        If you have any questions ,please call Clinical : Vianey Lopez/RN-BSN  at cell # [phone]         THIS FORM IS A PERMANENT PART OF THE MEDICAL RECORD

## 2024-10-07 NOTE — DISCHARGE INSTRUCTIONS
Going Home Instructions  In this section you will find the tools which will guide you through the first few days after you leave the hospital. Continued use of these tools will help you develop the skills necessary to keep your heart failure under control.     Home Care Instructions Following Heart Failure - the most important things to do every day include:   Weigh yourself and review the “Self-Check Plan” sheet every morning.   Call your cardiologist office if you are in the “Pay Attention-Use Caution” (yellow zone) or “Medical Alert-Warning!” (red zone) as outlined in the Self-Check Plan sheet.  Take your medicines as prescribed.  Limit your sodium (salt) intake.  Know when to call your cardiologist, primary doctor, or nurse.  Know when to seek emergency care.      Things for You to Remember:   1. See your doctor or healthcare provider as written on your discharge instructions.  It is important that you attend this appointment to make sure your symptoms are under control.     2. Your recommended sodium intake is 7158-9428 mg daily.    3.  Weigh yourself every day.    4. Some exercise and activity is important to help keep your heart functioning and strong. Unless instructed not to exercise, you may walk at a slow to moderate pace for 10-15 minutes 2-3 days per week to start. Pace your activity to prevent shortness of breath or fatigue. Stop exercising if you develop chest pain, lightheadedness, or significant shortness of breath.       Call Your Cardiologist If:   You gain 2-3 pounds in one day or 5 pounds in one week.  You have more difficulty breathing.  You are getting more tired with normal activity.  You are more short of breath lying down, or awaken at night short of breath.  You have swelling of your feet or legs.  You urinate less often during the day and more often at night.  You have cramps in your legs.  You have blurred vision or see yellowish-green halos around objects of lights.    Go to the  Emergency Room If:   You have pain or tightness in your chest  You are extremely short of breath  You are coughing up pink-frothy mucus  You are traveling and develop symptoms of worsening heart failure      ** Please follow up with your cardiologist or Advanced Practice Provider as written on your discharge instructions. If you are not provided with an appointment, let your nurse know so you can get an appointment**

## 2024-10-07 NOTE — PROGRESS NOTES
[facility] Hospitalist Progress Note     CC: Hospital Follow up    PCP: Oscar Pittman MD       Assessment/Plan:     Principal Problem:    Acute on chronic congestive heart failure, unspecified heart failure type (HCC)  Active Problems:    Hypoxia    Patient is a 89 year old male with PMH chronic systolic heart failure, Dementia living in [facility], generalized anxiety, hallucinations and delusions secondary to dementia, hyperlipidemia, pulmonary hypertension, atrial fibrillation on anticoagulation who presents from [facility] care with left-sided thorax pain.     Left-sided thorax pain appears to be reproducible on exam and worse with coughing or laughing I suspect this represents a rib contusion  -CT chest did not reveal any overt fractures  -No clear history of trauma has been reported  Pain improved with scheduled tramadol continue   Is able to take deep breath today with mild pain   Continue IS  Wean O2 if able  Will continued lasix 40 mg BID, per med list was not taking at [facility]   -Will repeat CXR today   Attempted to call daughter who did not answer this am   - continue lidocaine and tylenol   -Incentive spirometer at bedside  Wean O2 as able, is currently on 3 L     2.  Alzheimer's dementia with behavioral disturbances  -Continue home donepezil citalopram quetiapine and lorazepam as needed     3.  Mild acute on chronic systolic CHF exacerbation  -Resume oral lasix ( was not taking at [facility])  , S/P IV lasix in ED   Trend BMP repeat ordered tomorrow   Continue home losartan carvedilol Lipitor  -Mild trop elevation,  no symptoms consistent with acute coronary syndrome  -No clear etiology for leukocytosis will trend- repeat CBC tomorrow   -Noted mild elevation in total bilirubin, seems chronic, no further work-up at this time , maybe CHF related?     4.  Atrial fibrillation/flutter  Currently rate controlled continue Coreg and Eliquis     Right eye blepharitis   Erythrocin  ointment ordered x 5 days     DNR select discussed with daughter at bedside reviewed POLST     Obs, may need BRITTNEY as is weaker then normal per PT, dispo pending clinical course      FN:  - IVF:none   - Diet: Cardiac diet       Questions/concerns were discussed with patient and/or family by bedside.    Thank You,  Irving Keenan, DO    Hospitalist with [facility]     Subjective:     Pain in thorax improved, breathing is improved, no nausea     OBJECTIVE:    Blood pressure 136/76, pulse 68, temperature 97.2 °F (36.2 °C), temperature source Oral, resp. rate 20, weight 174 lb 8 oz (79.2 kg), SpO2 92%.    Temp:  [97.2 °F (36.2 °C)-98.2 °F (36.8 °C)] 97.2 °F (36.2 °C)  Pulse:  [56-68] 68  Resp:  [20-23] 20  BP: (129-150)/(74-83) 136/76  SpO2:  [91 %-92 %] 92 %      Intake/Output:    Intake/Output Summary (Last 24 hours) at 10/7/2024 0946  Last data filed at 10/7/2024 0904  Gross per 24 hour   Intake 350 ml   Output 1500 ml   Net -1150 ml       Last 3 Weights   10/07/24 0254 174 lb 8 oz (79.2 kg)   10/06/24 0607 177 lb 11.2 oz (80.6 kg)   10/05/24 1341 178 lb 8 oz (81 kg)   10/05/24 0821 170 lb (77.1 kg)   06/16/24 0906 180 lb (81.6 kg)   04/07/22 1022 192 lb (87.1 kg)       Exam   Gen: No acute distress, alert and oriented x1-2  Pulm: Lungs clear, normal respiratory effort, pain in left lower thorax to direct palpation   CV: Heart with regular rate and rhythm, no peripheral edema  Right eye with some mucus discharge       Data Review:       Labs:     Recent Labs   Lab 10/05/24  0835 10/06/24  0638   RBC 5.79 5.52   HGB 17.8* 17.0   HCT 55.1* 51.0   MCV 95.2 92.4   MCH 30.7 30.8   MCHC 32.3 33.3   RDW 15.8* 15.8*   NEPRELIM 9.85*  --    WBC 13.3* 12.9*   .0* 367.0         Recent Labs   Lab 10/05/24  0835 10/06/24  0638   * 101*   BUN 15 20   CREATSERUM 1.06 1.08   EGFRCR 66 64   CA 9.6 9.4    141   K 4.4 4.3    106   CO2 27.0 28.0       Recent Labs   Lab 10/05/24  0835 10/06/24  0638   ALT  30 19   AST 35* 21   ALB 4.4 4.1         Imaging:  CT CHEST+ABDOMEN+PELVIS(ALL CNTRST ONLY)(CPT=71260/35456)    Result Date: 10/5/2024  CONCLUSION:   1. Pulmonary edema.  No acute consolidation is seen.  Small bilateral pleural effusions.  2. No acute abnormality in the abdomen or pelvis.  No finding to explain left upper quadrant pain or leukocytosis.  No bowel obstruction.  Diverticulosis without evidence of acute diverticulitis.  No renal or ureteral stones or hydronephrosis.  3. Prostatomegaly and findings consistent with chronic bladder outlet obstruction.  4. Cardiomegaly.  5. Additional chronic or incidental findings are described in the body of this report.     Dictated by (CST): Bry Cardenas MD on 10/05/2024 at 11:55 AM     Finalized by (CST): Bry Cardenas MD on 10/05/2024 at 12:01 PM          XR CHEST AP/PA (1 VIEW) (CPT=71045)    Result Date: 10/5/2024  CONCLUSION:   Cardiomegaly with mild bilateral interstitial opacity which may reflect interstitial edema.  Mild patchy opacity left mid lung which could reflect atelectasis with or without superimposed pneumonia.    Dictated by (CST): Amarjit Christian MD on 10/05/2024 at 9:15 AM     Finalized by (CST): Amarjit Christian MD on 10/05/2024 at 9:16 AM             Meds:      erythromycin  1 Application Right Eye Q8H JEREMY    traMADol  50 mg Oral BID    docusate sodium  100 mg Oral BID    aspirin  81 mg Oral Daily    atorvastatin  10 mg Oral Nightly    carvedilol  6.25 mg Oral BID    escitalopram  10 mg Oral Daily    donepezil  10 mg Oral Nightly    apixaban  5 mg Oral BID    furosemide  40 mg Oral BID    losartan  25 mg Oral BID    QUEtiapine  25 mg Oral TID    acetaminophen  650 mg Oral q6h    lidocaine-menthol  1 patch Transdermal Daily         LORazepam    QUEtiapine    acetaminophen **OR** HYDROcodone-acetaminophen **OR** HYDROcodone-acetaminophen    polyethylene glycol (PEG 3350)    sennosides    bisacodyl    fleet enema    ondansetron     metoclopramide

## 2024-10-07 NOTE — PLAN OF CARE
Problem: Patient Centered Care  Goal: Patient preferences are identified and integrated in the patient's plan of care  Description: Interventions:  - What would you like us to know as we care for you?   - Provide timely, complete, and accurate information to patient/family  - Incorporate patient and family knowledge, values, beliefs, and cultural backgrounds into the planning and delivery of care  - Encourage patient/family to participate in care and decision-making at the level they choose  - Honor patient and family perspectives and choices  10/7/2024 1022 by Amara Moya RN  Outcome: Progressing  10/7/2024 1022 by Amara Moya RN  Outcome: Progressing     Problem: Patient/Family Goals  Goal: Patient/Family Long Term Goal  Description: Patient's Long Term Goal:     Interventions:  -   - See additional Care Plan goals for specific interventions  10/7/2024 1022 by Amara Moya RN  Outcome: Progressing  10/7/2024 1022 by Amara Moya RN  Outcome: Progressing  Goal: Patient/Family Short Term Goal  Description: Patient's Short Term Goal:     Interventions:   - - See additional Care Plan goals for specific interventions  10/7/2024 1022 by Amara Moya RN  Outcome: Progressing  10/7/2024 1022 by Amara Moya RN  Outcome: Progressing     Problem: SAFETY ADULT - FALL  Goal: Free from fall injury  Description: INTERVENTIONS:  - Assess pt frequently for physical needs  - Identify cognitive and physical deficits and behaviors that affect risk of falls.  - [place] fall precautions as indicated by assessment.  - Educate pt/family on patient safety including physical limitations  - Instruct pt to call for assistance with activity based on assessment  - Modify environment to reduce risk of injury  - Provide assistive devices as appropriate  - Consider OT/PT consult to assist with strengthening/mobility  - Encourage toileting schedule  10/7/2024 1022 by Amara Moya RN  Outcome: Progressing  10/7/2024 1022 by  Ahlfeld, Amara, RN  Outcome: Progressing     Problem: CARDIOVASCULAR - ADULT  Goal: Maintains optimal cardiac output and hemodynamic stability  Description: INTERVENTIONS:  - Monitor vital signs, rhythm, and trends  - Monitor for bleeding, hypotension and signs of decreased cardiac output  - Evaluate effectiveness of vasoactive medications to optimize hemodynamic stability  - Monitor arterial and/or venous puncture sites for bleeding and/or hematoma  - Assess quality of pulses, skin color and temperature  - Assess for signs of decreased coronary artery perfusion - ex. Angina  - Evaluate fluid balance, assess for edema, trend weights  10/7/2024 1022 by Amara Moya RN  Outcome: Progressing  10/7/2024 1022 by Amara Moya RN  Outcome: Progressing  Goal: Absence of cardiac arrhythmias or at baseline  Description: INTERVENTIONS:  - Continuous cardiac monitoring, monitor vital signs, obtain 12 lead EKG if indicated  - Evaluate effectiveness of antiarrhythmic and heart rate control medications as ordered  - Initiate emergency measures for life threatening arrhythmias  - Monitor electrolytes and administer replacement therapy as ordered  10/7/2024 1022 by Amara Moya RN  Outcome: Progressing  10/7/2024 1022 by Amara Moya RN  Outcome: Progressing     Problem: DISCHARGE PLANNING  Goal: Discharge to home or other facility with appropriate resources  Description: INTERVENTIONS:  - Identify barriers to discharge w/pt and caregiver  - Include patient/family/discharge partner in discharge planning  - Arrange for needed discharge resources and transportation as appropriate  - Identify discharge learning needs (meds, wound care, etc)  - Arrange for interpreters to assist at discharge as needed  - Consider post-discharge preferences of patient/family/discharge partner  - Complete POLST form as appropriate  - Assess patient's ability to be responsible for managing their own health  - Refer to Case Management Department for  coordinating discharge planning if the patient needs post-hospital services based on physician/LIP order or complex needs related to functional status, cognitive ability or social support system  10/7/2024 1022 by Amara Moya RN  Outcome: Progressing  10/7/2024 1022 by Amara Moya RN  Outcome: Progressing     Problem: RESPIRATORY - ADULT  Goal: Achieves optimal ventilation and oxygenation  Description: INTERVENTIONS:  - Assess for changes in respiratory status  - Assess for changes in mentation and behavior  - Position to facilitate oxygenation and minimize respiratory effort  - Oxygen supplementation based on oxygen saturation or ABGs  - Provide Smoking Cessation handout, if applicable  - Encourage broncho-pulmonary hygiene including cough, deep breathe, Incentive Spirometry  - Assess the need for suctioning and perform as needed  - Assess and instruct to report SOB or any respiratory difficulty  - Respiratory Therapy support as indicated  - Manage/alleviate anxiety  - Monitor for signs/symptoms of CO2 retention  10/7/2024 1022 by Amara Moya RN  Outcome: Progressing  10/7/2024 1022 by Amara Moya RN  Outcome: Progressing     Patient VSS, sleeping between care. He is on 3.5LO2, desaturates while sleeping on 3LO2NC to 88%. Patient takes pills crushed with pudding. Patient reports intermittent abdominal pain but cannot be more descriptive about pain when asked. Lidocaine patch applied. CXR completed. Eye is showing some yellow drainage and redness to outer lid, MD ordered Erythromycin for application.

## 2024-10-07 NOTE — PLAN OF CARE
Pt intermittently yells out in pain despite scheduled pain medications, stated \"I don't know why my ribs hurt\". Max assist d/t pain. Pleasant, follows commands. Remains on 3.5L O2. Plan: wean O2 as tolerated    Call light within reach, safety precautions in place  Problem: SAFETY ADULT - FALL  Goal: Free from fall injury  Description: INTERVENTIONS:  - Assess pt frequently for physical needs  - Identify cognitive and physical deficits and behaviors that affect risk of falls.  - [place] fall precautions as indicated by assessment.  - Educate pt/family on patient safety including physical limitations  - Instruct pt to call for assistance with activity based on assessment  - Modify environment to reduce risk of injury  - Provide assistive devices as appropriate  - Consider OT/PT consult to assist with strengthening/mobility  - Encourage toileting schedule  Outcome: Progressing     Problem: DISCHARGE PLANNING  Goal: Discharge to home or other facility with appropriate resources  Description: INTERVENTIONS:  - Identify barriers to discharge w/pt and caregiver  - Include patient/family/discharge partner in discharge planning  - Arrange for needed discharge resources and transportation as appropriate  - Identify discharge learning needs (meds, wound care, etc)  - Arrange for interpreters to assist at discharge as needed  - Consider post-discharge preferences of patient/family/discharge partner  - Complete POLST form as appropriate  - Assess patient's ability to be responsible for managing their own health  - Refer to Case Management Department for coordinating discharge planning if the patient needs post-hospital services based on physician/LIP order or complex needs related to functional status, cognitive ability or social support system  Outcome: Progressing     Problem: CARDIOVASCULAR - ADULT  Goal: Maintains optimal cardiac output and hemodynamic stability  Description: INTERVENTIONS:  - Monitor vital signs, rhythm,  and trends  - Monitor for bleeding, hypotension and signs of decreased cardiac output  - Evaluate effectiveness of vasoactive medications to optimize hemodynamic stability  - Monitor arterial and/or venous puncture sites for bleeding and/or hematoma  - Assess quality of pulses, skin color and temperature  - Assess for signs of decreased coronary artery perfusion - ex. Angina  - Evaluate fluid balance, assess for edema, trend weights  Outcome: Progressing  Goal: Absence of cardiac arrhythmias or at baseline  Description: INTERVENTIONS:  - Continuous cardiac monitoring, monitor vital signs, obtain 12 lead EKG if indicated  - Evaluate effectiveness of antiarrhythmic and heart rate control medications as ordered  - Initiate emergency measures for life threatening arrhythmias  - Monitor electrolytes and administer replacement therapy as ordered  Outcome: Progressing     Problem: RESPIRATORY - ADULT  Goal: Achieves optimal ventilation and oxygenation  Description: INTERVENTIONS:  - Assess for changes in respiratory status  - Assess for changes in mentation and behavior  - Position to facilitate oxygenation and minimize respiratory effort  - Oxygen supplementation based on oxygen saturation or ABGs  - Provide Smoking Cessation handout, if applicable  - Encourage broncho-pulmonary hygiene including cough, deep breathe, Incentive Spirometry  - Assess the need for suctioning and perform as needed  - Assess and instruct to report SOB or any respiratory difficulty  - Respiratory Therapy support as indicated  - Manage/alleviate anxiety  - Monitor for signs/symptoms of CO2 retention  Outcome: Progressing     Problem: METABOLIC/FLUID AND ELECTROLYTES - ADULT  Goal: Electrolytes maintained within normal limits  Description: INTERVENTIONS:  - Monitor labs and rhythm and assess patient for signs and symptoms of electrolyte imbalances  - Administer electrolyte replacement as ordered  - Monitor response to electrolyte replacements,  including rhythm and repeat lab results as appropriate  - Fluid restriction as ordered  - Instruct patient on fluid and nutrition restrictions as appropriate  Outcome: Progressing

## 2024-10-07 NOTE — CM/SW NOTE
10/07/24 1000   CM/SW Referral Data   Referral Source Social Work (self-referral)   Reason for Referral Discharge planning   Informant Daughter  (Bridget)   Medical Hx   Does patient have an established PCP? Yes  (Oscar Pittman)   Patient Info   Patient's Current Mental Status at Time of Assessment Confused or unable to complete assessment;Memory Impairments   Patient's Home Environment Memory Care Facility   Post Acute Care Provider Upon Admission   ([facility])   Patient Status Prior to Admission   Independent with ADLs and Mobility No   Pt. requires assistance with Housework;Driving;Meals;Bathing;Ambulating;Dressing;Medications   Services in place prior to admission DME/Supplies at home   Type of DME/Supplies Standard Walker   Discharge Needs   Anticipated D/C needs Subacute rehab   Services Requested   Submitted to [facility] Yes   PASRR Level 1 Submitted Yes  (queued for review)   Choice of Post-Acute Provider   Informed patient of right to choose their preferred provider Yes   List of appropriate post-acute services provided to patient/family with quality data No - Declined list   Patient/family choice [facility]       10:45AM  SW self referred pt for DC Planning as chart review showed Anticipated therapy need: Gradual Rehabilitative Therapy    SW spoke to pt's dtr Bridget via phone. Above assessment completed.  Pt lives at [facility]. He receives assistance for medications and bathing - sometimes/more often getting dressed. Pt has been using a walker for ambulation.    Pt receives onsite PT 2x/week at [facility].    Pt's dtr confirmed hx w/ BRITTNEY at [facility].  Discussed anticipated therapy need for BRITTNEY and next steps. Pt's dtrs are agreeable. Pt's dtr declined offer for [facility]ITTNEY list of quality data - they prefer pt return to [facility].    [facility] reserved in [place]. DSC Dianne requested to submit insurance auth via [employer].  PASRR completed and queued for review due to pt's Dementia  medications.    UPDATE: PASRR completed - no level 2 required. Uploaded to [employer].    12:10PM  Per DSC [facility] approved through 10/10.    SW spoke to Sheree rollins/  - Ambulance (AOX1, O2, max assist) set on WILL CALL through 10/8. PCS completed and will need date added day of actual DC.    PLAN: [facility], Ambulance on WC, PCS needs date - pending med clear      DALTON/GABO to remain available for support and/or discharge planning.       MS LayaW, LSW [id number]

## 2024-10-07 NOTE — CM/SW NOTE
Submitted clinical via [employer] portal.  [facility] Case/Auth ID is [id number].  Final insurance authorization is pending at this time.      SW/CM assigned to the case will continue to follow auth status.      Dianne Naranjo, DSC      Addendum:  10/7 -- Carlos ID [id number], approved 10/8 to 10/10    er

## 2024-10-08 VITALS
BODY MASS INDEX: 25 KG/M2 | RESPIRATION RATE: 18 BRPM | DIASTOLIC BLOOD PRESSURE: 69 MMHG | SYSTOLIC BLOOD PRESSURE: 122 MMHG | HEART RATE: 85 BPM | OXYGEN SATURATION: 93 % | WEIGHT: 173 LBS | TEMPERATURE: 98 F

## 2024-10-08 LAB
ANION GAP SERPL CALC-SCNC: 4 MMOL/L (ref 0–18)
BUN BLD-MCNC: 32 MG/DL (ref 9–23)
BUN/CREAT SERPL: 29.4 (ref 10–20)
CALCIUM BLD-MCNC: 10.1 MG/DL (ref 8.7–10.4)
CHLORIDE SERPL-SCNC: 107 MMOL/L (ref 98–112)
CO2 SERPL-SCNC: 33 MMOL/L (ref 21–32)
CREAT BLD-MCNC: 1.09 MG/DL
DEPRECATED RDW RBC AUTO: 52.9 FL (ref 35.1–46.3)
EGFRCR SERPLBLD CKD-EPI 2021: 64 ML/MIN/1.73M2 (ref 60–?)
ERYTHROCYTE [DISTWIDTH] IN BLOOD BY AUTOMATED COUNT: 15.5 % (ref 11–15)
GLUCOSE BLD-MCNC: 121 MG/DL (ref 70–99)
HCT VFR BLD AUTO: 53.3 %
HGB BLD-MCNC: 17.7 G/DL
MAGNESIUM SERPL-MCNC: 1.9 MG/DL (ref 1.6–2.6)
MCH RBC QN AUTO: 30.8 PG (ref 26–34)
MCHC RBC AUTO-ENTMCNC: 33.2 G/DL (ref 31–37)
MCV RBC AUTO: 92.7 FL
OSMOLALITY SERPL CALC.SUM OF ELEC: 306 MOSM/KG (ref 275–295)
PLATELET # BLD AUTO: 421 10(3)UL (ref 150–450)
POTASSIUM SERPL-SCNC: 4.2 MMOL/L (ref 3.5–5.1)
RBC # BLD AUTO: 5.75 X10(6)UL
SODIUM SERPL-SCNC: 144 MMOL/L (ref 136–145)
WBC # BLD AUTO: 16.6 X10(3) UL (ref 4–11)

## 2024-10-08 PROCEDURE — 97530 THERAPEUTIC ACTIVITIES: CPT

## 2024-10-08 PROCEDURE — 85027 COMPLETE CBC AUTOMATED: CPT | Performed by: INTERNAL MEDICINE

## 2024-10-08 PROCEDURE — 80048 BASIC METABOLIC PNL TOTAL CA: CPT | Performed by: INTERNAL MEDICINE

## 2024-10-08 PROCEDURE — 83735 ASSAY OF MAGNESIUM: CPT | Performed by: INTERNAL MEDICINE

## 2024-10-08 RX ORDER — LORAZEPAM 1 MG/1
1 TABLET ORAL EVERY 4 HOURS PRN
Qty: 30 TABLET | Refills: 0 | Status: ON HOLD | OUTPATIENT
Start: 2024-10-08

## 2024-10-08 RX ORDER — FUROSEMIDE 40 MG
40 TABLET ORAL DAILY
Status: ON HOLD | COMMUNITY
Start: 2024-10-09

## 2024-10-08 RX ORDER — ERYTHROMYCIN 5 MG/G
1 OINTMENT OPHTHALMIC EVERY 8 HOURS SCHEDULED
Status: ON HOLD | COMMUNITY
Start: 2024-10-08

## 2024-10-08 RX ORDER — TRAMADOL HYDROCHLORIDE 50 MG/1
50 TABLET ORAL 2 TIMES DAILY
Qty: 14 TABLET | Refills: 0 | Status: ON HOLD | OUTPATIENT
Start: 2024-10-08 | End: 2024-10-15

## 2024-10-08 RX ORDER — ACETAMINOPHEN 325 MG/1
650 TABLET ORAL
Status: ON HOLD | COMMUNITY
Start: 2024-10-08

## 2024-10-08 RX ORDER — FUROSEMIDE 10 MG/ML
40 INJECTION INTRAMUSCULAR; INTRAVENOUS ONCE
Status: COMPLETED | OUTPATIENT
Start: 2024-10-08 | End: 2024-10-08

## 2024-10-08 NOTE — PHYSICAL THERAPY NOTE
PHYSICAL THERAPY TREATMENT NOTE - INPATIENT     Room Number: 303/303-A       Presenting Problem: Acue on chronic congestive heart failure, new onset L abdomen/flank pain  Co-Morbidities : Dementia    Problem List  Principal Problem:    Acute on chronic congestive heart failure, unspecified heart failure type (HCC)  Active Problems:    Hypoxia      PHYSICAL THERAPY ASSESSMENT   Patient demonstrates good  progress this session, goals  remain in progress.      Patient is requiring moderate assist, maximum assist, and dependent as a result of the following impairments: decreased functional strength, decreased endurance/aerobic capacity, pain, impaired sitting and standing balance, decreased muscular endurance, cognitive deficits (A&O x 1-2, confusion), medical status, and increased O2 needs from baseline.     Patient continues to function below baseline with bed mobility, transfers, gait, maintaining seated position, and standing prolonged periods.  Next session anticipate patient to progress bed mobility, transfers, maintaining seated position, and standing prolonged periods.  Physical Therapy will continue to follow patient for duration of hospitalization.    Patient continues to benefit from continued skilled PT services: to promote return to prior level of function and safety with continuous assistance and gradual rehabilitative therapy .    PLAN DURING HOSPITALIZATION  Nursing Mobility Recommendation : Lift Equipment  PT Device Recommendation: Rolling walker;Mechanical lift (TBD)  PT Treatment Plan: Bed mobility;Body mechanics;Endurance;Energy conservation;Patient education;Gait training;Range of motion;Strengthening;Transfer training;Balance training  Frequency (Obs): 3-5x/week     SUBJECTIVE  \"Everything hurts\"     OBJECTIVE  Precautions: Abdominal protective strategies;Bed/chair alarm    WEIGHT BEARING RESTRICTION       PAIN ASSESSMENT   Rating: Unable to rate  Location: \"all over\"/ L abdomen/flank  Management  Techniques: Repositioning    BALANCE  Static Sitting: Poor +  Dynamic Sitting: Poor -  Static Standing: Not tested  Dynamic Standing: Not tested    ACTIVITY TOLERANCE  Pulse: 80  Heart Rate Source: Monitor      O2 WALK  Oxygen Therapy  SPO2% on Oxygen at Rest: 94  At rest oxygen flow (liters per minute): 3.5  SPO2% Ambulation on Oxygen: 96 (with activity NOT ambulation)  Ambulation oxygen flow (liters per minute): 3.5    AM-PAC '6-Clicks' INPATIENT SHORT FORM - BASIC MOBILITY  How much difficulty does the patient currently have...  Patient Difficulty: Turning over in bed (including adjusting bedclothes, sheets and blankets)?: A Lot   Patient Difficulty: Sitting down on and standing up from a chair with arms (e.g., wheelchair, bedside commode, etc.): Unable   Patient Difficulty: Moving from lying on back to sitting on the side of the bed?: A Lot   How much help from another person does the patient currently need...   Help from Another: Moving to and from a bed to a chair (including a wheelchair)?: Total   Help from Another: Need to walk in hospital room?: Total   Help from Another: Climbing 3-5 steps with a railing?: Total     AM-PAC Score:  Raw Score: 8   Approx Degree of Impairment: 86.62%   Standardized Score (AM-PAC Scale): 28.58   CMS Modifier (G-Code): CM    FUNCTIONAL ABILITY STATUS  Functional Mobility/Gait Assessment  Gait Assistance: Not tested  Rolling: maximum assist right<>left to position overhead lift sling under pt. Max to assume and Mod A to maintain sidelying position.   Supine to Sit: moderate assist and assist x 2. Assist with LE's and trunk. Sat EOB for 12 minutes requiring initial Mod A for balance and intermittent bouts of Min A. Cues to assume and maintain midline position. L lateral lean as pt fatigued.   Sit to Supine: moderate assist and assist x 2.   Bed>chair: Total assist. Utilized overhead lift to transfer to chair to increase pt's tolerance toward OOB activity, to facilitate improved  sitting tolerance, passive core strengthening and improved respiratory function.     Skilled Therapy Provided: Patient received supine in bed. RN approved activity. Therapist educated pt on POC and physiological benefits of mobilization. Patient agreeable to participate. Follows commands with increased time and intermittent confusion. Primary complaint is generalized pain, however, with repositioning, primarily c/o L abdominal quadrant pain that he does not quantify. *SpO2 on 3.5 L/min supplemental O2 via NC at rest: 94% with activity: 96%    The patient's Approx Degree of Impairment: 86.62% has been calculated based on documentation in the [facility] '6 clicks' Inpatient Daily Activity Short Form.  Research supports that patients with this level of impairment may benefit from LTAC, however, pt functioning lower than baseline levels and would benefit from rehab to address current deficits.  Final disposition will be made by interdisciplinary medical team.    THERAPEUTIC EXERCISES  Lower Extremity Ankle pumps  LAQ     Position Sitting       Patient End of Session: Up in chair;Needs met;Call light within reach;RN aware of session/findings;Alarm set    CURRENT GOALS   Goals to be met by: 10/20/24  Patient Goal Patient's self-stated goal is: none stated   Goal #1 Patient is able to demonstrate supine - sit EOB @ level: minimum assistance     Goal #1   Current Status Mod A x 2 supine<>sit   Goal #2 Patient is able to demonstrate transfers Sit to/from Stand at assistance level: minimum assistance with walker - rolling     Goal #2  Current Status NT  Total assist bed>chair with overhead lift   Goal #3 Patient is able to ambulate 15 feet with assist device: walker - rolling at assistance level: minimum assistance   Goal #3   Current Status NT   Goal #5 Patient to demonstrate independence with home activity/exercise instructions provided to patient in preparation for discharge.   Goal #5   Current Status ongoing     Therapeutic  Activity: 24 minutes

## 2024-10-08 NOTE — PLAN OF CARE
Pt refusing q2 repositions d/t pain w/ movement. Pillow support. Remains on 3.5L O2 NC. Plan: [facility] when medically cleared    Call light within reach, safety precautions in place  Problem: SAFETY ADULT - FALL  Goal: Free from fall injury  Description: INTERVENTIONS:  - Assess pt frequently for physical needs  - Identify cognitive and physical deficits and behaviors that affect risk of falls.  - [place] fall precautions as indicated by assessment.  - Educate pt/family on patient safety including physical limitations  - Instruct pt to call for assistance with activity based on assessment  - Modify environment to reduce risk of injury  - Provide assistive devices as appropriate  - Consider OT/PT consult to assist with strengthening/mobility  - Encourage toileting schedule  Outcome: Progressing     Problem: DISCHARGE PLANNING  Goal: Discharge to home or other facility with appropriate resources  Description: INTERVENTIONS:  - Identify barriers to discharge w/pt and caregiver  - Include patient/family/discharge partner in discharge planning  - Arrange for needed discharge resources and transportation as appropriate  - Identify discharge learning needs (meds, wound care, etc)  - Arrange for interpreters to assist at discharge as needed  - Consider post-discharge preferences of patient/family/discharge partner  - Complete POLST form as appropriate  - Assess patient's ability to be responsible for managing their own health  - Refer to Case Management Department for coordinating discharge planning if the patient needs post-hospital services based on physician/LIP order or complex needs related to functional status, cognitive ability or social support system  Outcome: Progressing     Problem: CARDIOVASCULAR - ADULT  Goal: Maintains optimal cardiac output and hemodynamic stability  Description: INTERVENTIONS:  - Monitor vital signs, rhythm, and trends  - Monitor for bleeding, hypotension and signs of decreased  cardiac output  - Evaluate effectiveness of vasoactive medications to optimize hemodynamic stability  - Monitor arterial and/or venous puncture sites for bleeding and/or hematoma  - Assess quality of pulses, skin color and temperature  - Assess for signs of decreased coronary artery perfusion - ex. Angina  - Evaluate fluid balance, assess for edema, trend weights  Outcome: Progressing  Goal: Absence of cardiac arrhythmias or at baseline  Description: INTERVENTIONS:  - Continuous cardiac monitoring, monitor vital signs, obtain 12 lead EKG if indicated  - Evaluate effectiveness of antiarrhythmic and heart rate control medications as ordered  - Initiate emergency measures for life threatening arrhythmias  - Monitor electrolytes and administer replacement therapy as ordered  Outcome: Progressing     Problem: RESPIRATORY - ADULT  Goal: Achieves optimal ventilation and oxygenation  Description: INTERVENTIONS:  - Assess for changes in respiratory status  - Assess for changes in mentation and behavior  - Position to facilitate oxygenation and minimize respiratory effort  - Oxygen supplementation based on oxygen saturation or ABGs  - Provide Smoking Cessation handout, if applicable  - Encourage broncho-pulmonary hygiene including cough, deep breathe, Incentive Spirometry  - Assess the need for suctioning and perform as needed  - Assess and instruct to report SOB or any respiratory difficulty  - Respiratory Therapy support as indicated  - Manage/alleviate anxiety  - Monitor for signs/symptoms of CO2 retention  Outcome: Progressing     Problem: METABOLIC/FLUID AND ELECTROLYTES - ADULT  Goal: Electrolytes maintained within normal limits  Description: INTERVENTIONS:  - Monitor labs and rhythm and assess patient for signs and symptoms of electrolyte imbalances  - Administer electrolyte replacement as ordered  - Monitor response to electrolyte replacements, including rhythm and repeat lab results as appropriate  - Fluid  restriction as ordered  - Instruct patient on fluid and nutrition restrictions as appropriate  Outcome: Progressing

## 2024-10-08 NOTE — PAYOR COMM NOTE
--------------OBSERVATION ON 10/5 INPATIENT ORDER ON 10/7    Admit Orders (From admission, onward)       Start     Ordered    10/07/24 1625  Admit to inpatient Once  Once        Ordering Provider: Irving Keenan DO   Question:  Diagnosis  Answer:  Acute on chronic congestive heart failure, unspecified heart failure type (HCC)    10/07/24 1624           10/05/24 1339  Place in observation Once  Once        Ordering Provider: Irving Keenan DO   Question:  Diagnosis  Answer:  Acute on chronic congestive heart failure, unspecified heart failure type (HCC)    10/05/24 1338                   ADMISSION REVIEW     Payor: [employer]  Subscriber #:  [id number]  Authorization Number: [id number]    Admit date: 10/7/24  Admit time:  4:25 PM       REVIEW DOCUMENTATION:     ED Provider Notes        ED Provider Notes signed by Tisha Gant MD at 10/5/2024  1:59 PM          Patient Seen in: [facility]       History     Chief Complaint   Patient presents with    Pain       There may be discrepancies from triage note.     HPI    History provided by EMS and patient.  Patient provides a poor history.  History of dementia.    History reviewed.   Past Medical History:    Acute on chronic systolic congestive heart failure (HCC)    Chronic combined systolic and diastolic congestive heart failure (HCC)    Chronic sinus bradycardia    Chronic systolic congestive heart failure (HCC)    Coronary artery disease involving native heart without angina pectoris, unspecified vessel or lesion type    Dementia (HCC)    Elevated hemoglobin (HCC)    History of myocardial infarction    Hypertensive CHF (congestive heart failure) (HCC)    Pulmonary hypertension (HCC)     ROS  Pertinent positives as listed.  All other organ systems are reviewed and are negative.    Constitutional and vital signs reviewed.      Social History and Family History elements reviewed from today, pertinent positives to the presenting problem  noted.    Physical Exam     ED Triage Vitals [10/05/24 0821]   BP (!) 152/108   Pulse 88   Resp 22   Temp 96.8 °F (36 °C)   Temp src Temporal   SpO2 98 %   O2 Device None (Room air)       All measures to prevent infection transmission during my interaction with the patient were taken. The patient was already wearing a droplet mask on my arrival to the room. Personal protective equipment including droplet mask, eye protection, and gloves were worn throughout the duration of the exam.  Handwashing was performed prior to and after the exam.  Stethoscope and any equipment used during my examination was cleaned with super sani-cloth germicidal wipes following the exam.     Physical Exam      Review of prior notes in Care everywhere/Epic performed by myself:    -Patient had an echocardiogram February 2022 revealing ejection fraction of 45%  Patient had a chest x-ray completed April 2024 with no signs of pulmonary edema    ED Course     If labs obtained, they are personally reviewed by myself:     Labs Reviewed   CBC WITH DIFFERENTIAL WITH PLATELET - Abnormal; Notable for the following components:       Result Value    WBC 13.3 (*)     HGB 17.8 (*)     HCT 55.1 (*)     RDW-SD 55.6 (*)     RDW 15.8 (*)     .0 (*)     Immature Platelet Fraction 10.9 (*)     Neutrophil Absolute Prelim 9.85 (*)     Neutrophil Absolute 9.85 (*)     Monocyte Absolute 1.19 (*)     All other components within normal limits   COMP METABOLIC PANEL (14) - Abnormal; Notable for the following components:    Glucose 136 (*)     Calculated Osmolality 299 (*)     AST 35 (*)     Bilirubin, Total 3.6 (*)     All other components within normal limits   TROPONIN I HIGH SENSITIVITY - Abnormal; Notable for the following components:    Troponin I (High Sensitivity) 59 (*)     All other components within normal limits   PRO BETA NATRIURETIC PEPTIDE - Abnormal; Notable for the following components:    Pro-Beta Natriuretic Peptide 6,576 (*)     All other  components within normal limits   TROPONIN I HIGH SENSITIVITY - Abnormal; Notable for the following components:    Troponin I (High Sensitivity) 61 (*)     All other components within normal limits   LIPASE - Normal   LIPID PANEL - Normal   ED/MRSA SCREEN BY PCR-CC - Normal   TROPONIN I HIGH SENSITIVITY   RAINBOW DRAW BLUE       If radiologic studies ordered during today's ER visit, my independent interpretation are seen directly below.  This is awaiting the radiologist's final interpretation.  CT chest/abd pelvis, independent interpretation of radiologic study completed by myself and awaiting formal radiologist interpretation: No pneumothorax, no abdominal perforation      Imaging Results read by radiology in ED: CT CHEST+ABDOMEN+PELVIS(ALL CNTRST ONLY)(CPT=71260/40478)    Result Date: 10/5/2024  CONCLUSION:   1. Pulmonary edema.  No acute consolidation is seen.  Small bilateral pleural effusions.  2. No acute abnormality in the abdomen or pelvis.  No finding to explain left upper quadrant pain or leukocytosis.  No bowel obstruction.  Diverticulosis without evidence of acute diverticulitis.  No renal or ureteral stones or hydronephrosis.  3. Prostatomegaly and findings consistent with chronic bladder outlet obstruction.  4. Cardiomegaly.  5. Additional chronic or incidental findings are described in the body of this report.     Dictated by (CST): Bry Cardenas MD on 10/05/2024 at 11:55 AM     Finalized by (CST): Bry Cardenas MD on 10/05/2024 at 12:01 PM          XR CHEST AP/PA (1 VIEW) (CPT=71045)    Result Date: 10/5/2024  CONCLUSION:   Cardiomegaly with mild bilateral interstitial opacity which may reflect interstitial edema.  Mild patchy opacity left mid lung which could reflect atelectasis with or without superimposed pneumonia.    Dictated by (CST): Amarjit Christian MD on 10/05/2024 at 9:15 AM     Finalized by (CST): Amarjit Christian MD on 10/05/2024 at 9:16 AM               ED Medications Administered:    Medications   aspirin DR tab 81 mg (has no administration in time range)   atorvastatin (Lipitor) tab 10 mg (has no administration in time range)   carvedilol (Coreg) tab 6.25 mg (has no administration in time range)   escitalopram (Lexapro) tab 10 mg (has no administration in time range)   donepezil (Aricept) tab 10 mg (has no administration in time range)   apixaban (Eliquis) tab 5 mg (has no administration in time range)   furosemide (Lasix) tab 40 mg (has no administration in time range)   LORazepam (Ativan) tab 1 mg (has no administration in time range)   losartan (Cozaar) tab 25 mg (has no administration in time range)   QUEtiapine (SEROquel) tab 12.5 mg (has no administration in time range)   QUEtiapine (SEROquel) tab 25 mg (has no administration in time range)   traMADol (Ultram) tab 50 mg (has no administration in time range)   acetaminophen (Tylenol) tab 650 mg (has no administration in time range)   lidocaine-menthol 4-1 % patch 1 patch (has no administration in time range)   acetaminophen (Tylenol) tab 650 mg (has no administration in time range)     Or   HYDROcodone-acetaminophen (Norco) 5-325 MG per tab 1 tablet (has no administration in time range)     Or   HYDROcodone-acetaminophen (Norco) 5-325 MG per tab 2 tablet (has no administration in time range)   polyethylene glycol (PEG 3350) (Miralax) 17 g oral packet 17 g (has no administration in time range)   sennosides (Senokot) tab 17.2 mg (has no administration in time range)   bisacodyl (Dulcolax) 10 MG rectal suppository 10 mg (has no administration in time range)   fleet enema (Fleet) rectal enema 133 mL (has no administration in time range)   ondansetron (Zofran) 4 MG/2ML injection 4 mg (has no administration in time range)   metoclopramide (Reglan) 5 mg/mL injection 5 mg (has no administration in time range)   iopamidol 76% (ISOVUE-370) injection for power injector (80 mL Intravenous Given 10/5/24 1127)   furosemide (Lasix) 10 mg/mL injection  40 mg (40 mg Intravenous Given 10/5/24 1308)           Vitals:    10/05/24 1200 10/05/24 1230 10/05/24 1332 10/05/24 1341   BP: (!) 176/97 (!) 175/94 (!) 162/108    BP Location:   Right arm    Pulse: 67 73 77    Resp: 20 23 20    Temp:   98.4 °F (36.9 °C)    TempSrc:   Oral    SpO2: 95% 94% 96%    Weight:    81 kg     *I personally reviewed and interpreted all ED vitals.    Pulse Ox interpretation by myself: 96%, Room air, Normal     Monitor Interpretation by myself:   atrial fibrillation, with ventricular rate of 88    If Ekg obtained during today's visit, it is independently interpreted by myself directly below:  EKG    Rate, intervals and axes as noted on EKG Report.  Rate: 95  Rhythm: Atrial Fibrillation  Reading: no st elevation, no st depression, normal t waves                Medical Record Review: I personally reviewed available prior medical records for any recent pertinent discharge summaries, testing, and procedures and reviewed those reports.      [facility]     Medical decision making/ED Course:   89-year-old male with history of dementia sent from memory care clinic for left upper quadrant abdominal pain and 1 episode of vomiting.  I suspect his symptoms are secondary to pulmonary edema.  Patient desaturated to 90% while at rest/sleeping however this is improved while awake and remained greater than 95% on room air.  Decreased air exchange noted however no tachypnea.  X-ray concerning for pulmonary edema.  BNP elevated at 6500.  Initial troponin slightly elevated and repeat troponin 2 hours thereafter remained stable at 61.  Patient with no active chest pain, shortness of breath.  Patient's only complaint is left upper quadrant abdominal pain with no focal abdominal tenderness on exam.  Slight leukocytosis of 13.3 noted.  Hemoglobin 17.8, elevated and likely secondary to dehydration.  Platelet count also slightly elevated-probably secondary to dehydration.  BMP normal.  Lipase normal.  Total bilirubin elevated  at 3.6 however other LFTs normal.  Cholecystitis/choledocholithiasis seems less likely.  Patient with no focal abdominal tenderness and with equal distal pulses.  Given age, risk factors, equivocal chest x-ray concerning for pneumonia versus CHF, CT chest/abdomen/pelvis obtained revealing pulmonary edema, no obvious consolidation/acute life-threatening abdominal pathology.  Patient given Lasix here in the emergency department.  Case discussed with hospitalist who agreed with ER management, no further recommendations at this time  Status post evaluation of patient in the emergency room by hospitalist.      Atypical ACS, PE, cholecystitis, AAA, dissection, pancreatitis, mesenteric ischemia, volvulus, bowel obstruction, appendicitis, among other life-threatening medical conditions considered and seems unlikely given patient's history, exam, and appearance.  Pt agrees and is aware of plan.       Differential Diagnosis:  as listed above in medical decision making.   *Please note that in the presenting to the emergency department, illness/injury that poses a threat to life or function is considered during this patient's initial evaluation.    The complexity of this visit is therefore inherently more complex given the need to consider life threatening pathology prior to any other etiology for this patient's visit.    The differential diagnosis and medical decision above exemplify this rationale.       Medical Decision Making  Problems Addressed:  Acute on chronic congestive heart failure, unspecified heart failure type (HCC): acute illness or injury  Hypoxia: acute illness or injury    Amount and/or Complexity of Data Reviewed  Independent Historian: EMS  External Data Reviewed: radiology and notes.  Labs: ordered. Decision-making details documented in ED Course.  Radiology: ordered and independent interpretation performed. Decision-making details documented in ED Course.  ECG/medicine tests: ordered and independent  interpretation performed. Decision-making details documented in ED Course.  Discussion of management or test interpretation with external provider(s): Hospitalist    Risk  Decision regarding hospitalization.  Diagnosis or treatment significantly limited by social determinants of health.          ED Course as of 10/05/24 1354  ------------------------------------------------------------  Time: 10/05 0922  Comment: Improved blood pressure, no distress.  Initial hypertensive reading was likely secondary to his agitation  ------------------------------------------------------------  Time: 10/05 0956  Comment: Patient saturating 90% when sleeping, no tachypnea, no distress.  Blood pressure normalized.       Vitals:    10/05/24 1200 10/05/24 1230 10/05/24 1332 10/05/24 1341   BP: (!) 176/97 (!) 175/94 (!) 162/108    BP Location:   Right arm    Pulse: 67 73 77    Resp: 20 23 20    Temp:   98.4 °F (36.9 °C)    TempSrc:   Oral    SpO2: 95% 94% 96%    Weight:    81 kg             Complicating Factors: Significant medical problems that contribute to the complexity of this emergency room evaluation is listed above.    Condition upon leaving the department: Stable    Disposition and Plan     Clinical Impression:  1. Acute on chronic congestive heart failure, unspecified heart failure type (HCC)    2. Hypoxia      Hospital Problems       Present on Admission  Date Reviewed: 4/7/2022            ICD-10-CM Noted POA    * (Principal) Acute on chronic congestive heart failure, unspecified heart failure type (HCC) I50.9 10/5/2024 Unknown    Hypoxia R09.02 10/5/2024 Unknown          Signed by Tisha Gant MD on 10/5/2024  1:59 PM         History and Physical    H&P signed by Irving Keenan DO at 10/5/2024 12:49 PM       [facility] Hospitalist H&P         CC:       Chief Complaint   Patient presents with    Pain         PCP: Oscar Pittman MD     History of Present Illness: Patient is a 89 year old male with PMH chronic  systolic heart failure, Dementia living in [facility], generalized anxiety, hallucinations and delusions secondary to dementia, hyperlipidemia, pulmonary hypertension, atrial fibrillation on anticoagulation who presents from memory care with left-sided thorax pain.  Patient unable to provide history, daughter at bedside who reports over the last 2 days patient was wincing in pain with variable movements mostly focused on the left lower abdomen or left thorax she does not know of or is aware of any particular fall injury or trauma.  He has never had any similar complaints previous.  He is denying shortness of breath chest pain palpitations nausea vomiting fevers or chills.      In the emergency department workup revealed elevation in BNP chest x-ray and CT with pulmonary congestion mild hypoxia on room air to 90% CT of the abdomen without acute findings bilirubin was also noted to be elevated with a mild elevation in white blood cell count was also present.   Patient was given 40 mg of IV Lasix in the emergency department and admitted to the hospital for further observation.  I did discuss with the daughter in detail that discharge back to [facility] care may be appropriate she prefers observation with hospitalization   For the next 24 hours.        ASSESSMENT / PLAN:   Patient is a 89 year old male with PMH chronic systolic heart failure, Dementia living in [facility], generalized anxiety, hallucinations and delusions secondary to dementia, hyperlipidemia, pulmonary hypertension, atrial fibrillation on anticoagulation who presents from memory care with left-sided thorax pain.     Left-sided thorax pain appears to be reproducible on exam and worse with coughing or laughing I suspect this represents a rib contusion  -CT chest did not reveal any overt fractures  -No clear history of trauma has been reported  -For now we will trial lidocaine patch and scheduled Tylenol  -Incentive  spirometer at bedside  -Tramadol as needed for pain     2.  Alzheimer's dementia with behavioral disturbances  -Continue home donepezil citalopram quetiapine and lorazepam as needed     3.  Mild acute on chronic systolic CHF exacerbation  -IV Lasix 40 mg given in the emergency department patient does not appear volume overloaded on my exam although BNP is elevated  -Resume oral Lasix 40 twice daily starting tomorrow  Trend BMP  Continue home losartan carvedilol Lipitor  -Will repeat troponin to rule out ACS although patient has no symptoms consistent with acute coronary syndrome  -No clear etiology for leukocytosis will trend  -Noted mild elevation in total bilirubin likely related to CHF repeat tomorrow     4.  Atrial fibrillation/flutter  Currently rate controlled continue Coreg and Eliquis     DNR select discussed with daughter at bedside reviewed POLST          FN:  - IVF:none   - Diet: Cardiac diet     DVT Prophy: Home anticoagulation  Lines: Peripheral IV     Dispo: pending clinical course     Outpatient records or previous hospital records reviewed.      Further recommendations pending patient's clinical course.  [facility] hospitalist to continue to follow patient while in house     Patient and/or patient's family given opportunity to ask questions and note understanding and agreeing with therapeutic plan as outlined     Thank You,  Irving Keenan DO     Hospitalist with [facility]              10/6         Date of Service: 10/6/2024 11:59 AM     Signed         [facility] Hospitalist Progress Note      CC: Hospital Follow up     PCP: Oscar Pittman MD         Assessment/Plan:      Principal Problem:    Acute on chronic congestive heart failure, unspecified heart failure type (HCC)  Active Problems:    Hypoxia        Patient is a 89 year old male with PMH chronic systolic heart failure, Dementia living in [facility] memory care, generalized anxiety, hallucinations and delusions secondary to  dementia, hyperlipidemia, pulmonary hypertension, atrial fibrillation on anticoagulation who presents from memory care with left-sided thorax pain.     Left-sided thorax pain appears to be reproducible on exam and worse with coughing or laughing I suspect this represents a rib contusion  -CT chest did not reveal any overt fractures  -No clear history of trauma has been reported  -Pain persists , will trial scheduled tramadol 50 mg BID , continue lidocaine and tylenol   -Incentive spirometer at bedside  -Discussed with daughter on the phone    -Could consider muscle relaxer   Wean O2 as able, is currently on 2 L      2.  Alzheimer's dementia with behavioral disturbances  -Continue home donepezil citalopram quetiapine and lorazepam as needed     3.  Mild acute on chronic systolic CHF exacerbation  -Resume oral lasix , S/P IV lasix in ED   Trend BMP  Continue home losartan carvedilol Lipitor  -Will repeat troponin to rule out ACS although patient has no symptoms consistent with acute coronary syndrome  -No clear etiology for leukocytosis will trend  -Noted mild elevation in total bilirubin, seems chronic, no further work-up at this time      4.  Atrial fibrillation/flutter  Currently rate controlled continue Coreg and Eliquis            FN:  - IVF:none   - Diet: Cardiac diet        Questions/concerns were discussed with patient and/or family by bedside.     Thank You,  Irving Keenan, DO     Hospitalist with [facility]      Subjective:      Ongoing pain in left thorax with deep breaths or turning      OBJECTIVE:     Blood pressure (!) 155/95, pulse 62, temperature 98.3 °F (36.8 °C), temperature source Axillary, resp. rate 20, weight 177 lb 11.2 oz (80.6 kg), SpO2 91%.     Temp:  [98.1 °F (36.7 °C)-98.5 °F (36.9 °C)] 98.3 °F (36.8 °C)  Pulse:  [55-77] 62  Resp:  [15-23] 20  BP: (136-176)/() 155/95  SpO2:  [91 %-96 %] 91 %        Intake/Output:     Intake/Output Summary (Last 24 hours) at 10/6/2024  1159  Last data filed at 10/6/2024 0900      Gross per 24 hour   Intake 590 ml   Output 1300 ml   Net -710 ml              Last 3 Weights   10/06/24 0607 177 lb 11.2 oz (80.6 kg)   10/05/24 1341 178 lb 8 oz (81 kg)   10/05/24 0821 170 lb (77.1 kg)   06/16/24 0906 180 lb (81.6 kg)   04/07/22 1022 192 lb (87.1 kg)         Exam   Gen: No acute distress, alert and oriented x1-2  Pulm: Lungs clear, normal respiratory effort, pain in left lower thorax   CV: Heart with regular rate and rhythm, no peripheral edema           Data Review:       Labs:           Recent Labs   Lab 10/05/24  0835 10/06/24  0638   RBC 5.79 5.52   HGB 17.8* 17.0   HCT 55.1* 51.0   MCV 95.2 92.4   MCH 30.7 30.8   MCHC 32.3 33.3   RDW 15.8* 15.8*   NEPRELIM 9.85*  --    WBC 13.3* 12.9*   .0* 367.0                 Recent Labs   Lab 10/05/24  0835 10/06/24  0638   * 101*   BUN 15 20   CREATSERUM 1.06 1.08   EGFRCR 66 64   CA 9.6 9.4    141   K 4.4 4.3    106   CO2 27.0 28.0              Recent Labs   Lab 10/05/24  0835 10/06/24  0638   ALT 30 19   AST 35* 21   ALB 4.4 4.1            Imaging:  CT CHEST+ABDOMEN+PELVIS(ALL CNTRST ONLY)(CPT=71260/08755)     Result Date: 10/5/2024  CONCLUSION:          1. Pulmonary edema.  No acute consolidation is seen.  Small bilateral pleural effusions.  2. No acute abnormality in the abdomen or pelvis.  No finding to explain left upper quadrant pain or leukocytosis.  No bowel obstruction.  Diverticulosis without evidence of acute diverticulitis.  No renal or ureteral stones or hydronephrosis.  3. Prostatomegaly and findings consistent with chronic bladder outlet obstruction.  4. Cardiomegaly.  5. Additional chronic or incidental findings are described in the body of this report.     Dictated by (CST): Bry Cardenas MD on 10/05/2024 at 11:55 AM     Finalized by (CST): Bry Cardenas MD on 10/05/2024 at 12:01 PM           XR CHEST AP/PA (1 VIEW) (CPT=71045)     Result Date: 10/5/2024  CONCLUSION:           Cardiomegaly with mild bilateral interstitial opacity which may reflect interstitial edema.  Mild patchy opacity left mid lung which could reflect atelectasis with or without superimposed pneumonia.    Dictated by (CST): Amarjit Christian MD on 10/05/2024 at 9:15 AM     Finalized by (CST): Amrajit Christian MD on 10/05/2024 at 9:16 AM               Meds:       traMADol  50 mg Oral BID    docusate sodium  100 mg Oral BID    aspirin  81 mg Oral Daily    atorvastatin  10 mg Oral Nightly    carvedilol  6.25 mg Oral BID    escitalopram  10 mg Oral Daily    donepezil  10 mg Oral Nightly    apixaban  5 mg Oral BID    furosemide  40 mg Oral BID    losartan  25 mg Oral BID    QUEtiapine  25 mg Oral TID    acetaminophen  650 mg Oral q6h    lidocaine-menthol  1 patch Transdermal Daily                      10/7       Date of Service: 10/7/2024  9:46 AM     Signed         [facility] Hospitalist Progress Note      CC: Hospital Follow up     PCP: Oscar Pittman MD         Assessment/Plan:      Principal Problem:    Acute on chronic congestive heart failure, unspecified heart failure type (HCC)  Active Problems:    Hypoxia     Patient is a 89 year old male with PMH chronic systolic heart failure, Dementia living in [facility], generalized anxiety, hallucinations and delusions secondary to dementia, hyperlipidemia, pulmonary hypertension, atrial fibrillation on anticoagulation who presents from [facility] with left-sided thorax pain.     Left-sided thorax pain appears to be reproducible on exam and worse with coughing or laughing I suspect this represents a rib contusion  -CT chest did not reveal any overt fractures  -No clear history of trauma has been reported  Pain improved with scheduled tramadol continue   Is able to take deep breath today with mild pain   Continue IS  Wean O2 if able  Will continued lasix 40 mg BID, per med list was not taking at [facility]   -Will repeat CXR today    Attempted to call daughter who did not answer this am   - continue lidocaine and tylenol   -Incentive spirometer at bedside  Wean O2 as able, is currently on 3 L      2.  Alzheimer's dementia with behavioral disturbances  -Continue home donepezil citalopram quetiapine and lorazepam as needed     3.  Mild acute on chronic systolic CHF exacerbation  -Resume oral lasix ( was not taking at [facility])  , S/P IV lasix in ED   Trend BMP repeat ordered tomorrow   Continue home losartan carvedilol Lipitor  -Mild trop elevation,  no symptoms consistent with acute coronary syndrome  -No clear etiology for leukocytosis will trend- repeat CBC tomorrow   -Noted mild elevation in total bilirubin, seems chronic, no further work-up at this time , maybe CHF related?     4.  Atrial fibrillation/flutter  Currently rate controlled continue Coreg and Eliquis     Right eye blepharitis   Erythrocin ointment ordered x 5 days      DNR select discussed with daughter at bedside reviewed POLST          FN:  - IVF:none   - Diet: Cardiac diet        Questions/concerns were discussed with patient and/or family by bedside.     Thank You,  Irving Keenan, DO     Hospitalist with [facility]      Subjective:      Pain in thorax improved, breathing is improved, no nausea      OBJECTIVE:     Blood pressure 136/76, pulse 68, temperature 97.2 °F (36.2 °C), temperature source Oral, resp. rate 20, weight 174 lb 8 oz (79.2 kg), SpO2 92%.     Temp:  [97.2 °F (36.2 °C)-98.2 °F (36.8 °C)] 97.2 °F (36.2 °C)  Pulse:  [56-68] 68  Resp:  [20-23] 20  BP: (129-150)/(74-83) 136/76  SpO2:  [91 %-92 %] 92 %        Intake/Output:     Intake/Output Summary (Last 24 hours) at 10/7/2024 0946  Last data filed at 10/7/2024 0904      Gross per 24 hour   Intake 350 ml   Output 1500 ml   Net -1150 ml              Last 3 Weights   10/07/24 0254 174 lb 8 oz (79.2 kg)   10/06/24 0607 177 lb 11.2 oz (80.6 kg)   10/05/24 1341 178 lb 8 oz (81 kg)   10/05/24 0821 170 lb  (77.1 kg)   06/16/24 0906 180 lb (81.6 kg)   04/07/22 1022 192 lb (87.1 kg)         Exam   Gen: No acute distress, alert and oriented x1-2  Pulm: Lungs clear, normal respiratory effort, pain in left lower thorax to direct palpation   CV: Heart with regular rate and rhythm, no peripheral edema  Right eye with some mucus discharge         Data Review:       Labs:           Recent Labs   Lab 10/05/24  0835 10/06/24  0638   RBC 5.79 5.52   HGB 17.8* 17.0   HCT 55.1* 51.0   MCV 95.2 92.4   MCH 30.7 30.8   MCHC 32.3 33.3   RDW 15.8* 15.8*   NEPRELIM 9.85*  --    WBC 13.3* 12.9*   .0* 367.0                 Recent Labs   Lab 10/05/24  0835 10/06/24  0638   * 101*   BUN 15 20   CREATSERUM 1.06 1.08   EGFRCR 66 64   CA 9.6 9.4    141   K 4.4 4.3    106   CO2 27.0 28.0              Recent Labs   Lab 10/05/24  0835 10/06/24  0638   ALT 30 19   AST 35* 21   ALB 4.4 4.1            Imaging:  CT CHEST+ABDOMEN+PELVIS(ALL CNTRST ONLY)(CPT=71260/45183)     Result Date: 10/5/2024  CONCLUSION:          1. Pulmonary edema.  No acute consolidation is seen.  Small bilateral pleural effusions.  2. No acute abnormality in the abdomen or pelvis.  No finding to explain left upper quadrant pain or leukocytosis.  No bowel obstruction.  Diverticulosis without evidence of acute diverticulitis.  No renal or ureteral stones or hydronephrosis.  3. Prostatomegaly and findings consistent with chronic bladder outlet obstruction.  4. Cardiomegaly.  5. Additional chronic or incidental findings are described in the body of this report.     Dictated by (CST): Bry Cardenas MD on 10/05/2024 at 11:55 AM     Finalized by (CST): Bry Cardenas MD on 10/05/2024 at 12:01 PM           XR CHEST AP/PA (1 VIEW) (CPT=71045)     Result Date: 10/5/2024  CONCLUSION:          Cardiomegaly with mild bilateral interstitial opacity which may reflect interstitial edema.  Mild patchy opacity left mid lung which could reflect atelectasis with or without  superimposed pneumonia.    Dictated by (CST): Amarjit Christian MD on 10/05/2024 at 9:15 AM     Finalized by (CST): Amarjit Christian MD on 10/05/2024 at 9:16 AM               Meds:       erythromycin  1 Application Right Eye Q8H JEREMY    traMADol  50 mg Oral BID    docusate sodium  100 mg Oral BID    aspirin  81 mg Oral Daily    atorvastatin  10 mg Oral Nightly    carvedilol  6.25 mg Oral BID    escitalopram  10 mg Oral Daily    donepezil  10 mg Oral Nightly    apixaban  5 mg Oral BID    furosemide  40 mg Oral BID    losartan  25 mg Oral BID    QUEtiapine  25 mg Oral TID    acetaminophen  650 mg Oral q6h    lidocaine-menthol  1 patch Transdermal Daily                                 Medication Administration Report  for Angel Luis Alex as of 09/29/24 through 10/08/24  Legend:         Medications 09/29 09/30 10/01 10/02 10/03 10/04 10/05 10/06 10/07 10/08               Or   HYDROcodone-acetaminophen (Norco) 5-325 MG per tab 1 tablet  Dose: 1 tablet  Freq: Every 4 hours PRN Route: OR  PRN Reason: moderate pain  Start: 10/05/24 1338   Admin Instructions:   Use PRN reason as a guide and follow range order policy           85929      44746                     acetaminophen (Tylenol) tab 650 mg  Dose: 650 mg  Freq: Every 6 hours Route: OR  Start: 10/05/24 1345          40662     71410      66297     692315     779151     12     757461      237461     718500     646969     476166      (0200)18     0800     1400     2000        apixaban (Eliquis) tab 5 mg  Dose: 5 mg  Freq: 2 times daily Route: OR  Start: 10/05/24 1345          916773     844694      467428     187043      168289     964792      0900     2100        aspirin DR tab 81 mg  Dose: 81 mg  Freq: Daily Route: OR  Start: 10/06/24 0930   Admin Instructions:   Do not crush           [id number]      396149      0930        atorvastatin (Lipitor) tab 10 mg  Dose: 10 mg  Freq: Nightly Route: OR  Start: 10/05/24 2100          621433      980736      773956      2100                      carvedilol (Coreg) tab 6.25 mg  Dose: 6.25 mg  Freq: 2 times daily Route: OR  Start: 10/05/24 1345          795804     993451      120444     418720      268267     140679      900        docusate sodium (Colace) cap 100 mg  Dose: 100 mg  Freq: 2 times daily Route: OR  Start: 10/06/24 0945   Admin Instructions:   Do not crush           [id number]     528428      005209     282926      0900             donepezil (Aricept) tab 10 mg  Dose: 10 mg  Freq: Nightly Route: OR  Start: 10/05/24 2100          423268      481600      205385              erythromycin (Romycin) 5 MG/GM ophthalmic ointment 1 Application  Dose: 1 Application  Freq: Every 8 hours scheduled Route: Right Eye  Start: 10/07/24 1400 End: 10/12/24 0559   Admin Instructions:    one dose each eye within 1 hour after birth            215939     379704      027234     1400     2200        escitalopram (Lexapro) tab 10 mg  Dose: 10 mg  Freq: Daily Route: OR  Start: 10/06/24 0930           029790      752673      0930                     lidocaine-menthol 4-1 % patch 1 patch  Dose: 1 patch  Freq: Daily Route: TD  Start: 10/05/24 1345   Order specific questions:             [id number]      112480     652789     060259      753284     237770      0930        LORazepam (Ativan) tab 1 mg  Dose: 1 mg  Freq: Every 4 hours PRN Route: OR  PRN Reason: Anxiety  Start: 10/05/24 1338                losartan (Cozaar) tab 25 mg  Dose: 25 mg  Freq: 2 times daily Route: OR  Start: 10/05/24 1345          571974     102967      224371     862711      170646     866754      900                     ondansetron (Zofran) 4 MG/2ML injection 4 mg  Dose: 4 mg  Freq: Every 6 hours PRN Route: IV  PRN Reasons: Nausea,vomiting  Start: 10/05/24 1338   Admin Instructions:   Default antiemetic sequence (unless otherwise preferred by patient):  1. ondansetron (Zofran) 2. metoclopramide (Reglan)  Wait 15 minutes before proceeding to next medication in sequence.   Follow therapeutic duplication policy.          [id number]                                     QUEtiapine (SEROquel) tab 25 mg  Dose: 25 mg  Freq: 3 times daily Route: OR  Start: 10/05/24 1600          542277     079251      714793     602178     691383      667341     730454     090193      0900     1600     2100                     traMADol (Ultram) tab 50 mg  Dose: 50 mg  Freq: 2 times daily Route: OR  Start: 10/06/24 0945   Admin Instructions:   Max dose 400 mg/day           296697     663057      688974     507393      0900     2100        Completed Medications   Medications 09/29 09/30 10/01 10/02 10/03 10/04 10/05 10/06 10/07 10/08   furosemide (Lasix) 10 mg/mL injection 40 mg  Dose: 40 mg  Freq: Once Route: IV  Start: 10/07/24 1415 End: 10/07/24 1507            [id number]         furosemide (Lasix) 10 mg/mL injection 40 mg  Dose: 40 mg  Freq: Once Route: IV  Start: 10/05/24 1235 End: 10/05/24 1308          151110           iopamidol 76% (ISOVUE-370) injection for power injector  Dose: 80 mL  Freq: IMG once as needed Route: IV  PRN Reason: contrast  Start: 10/05/24 1126 End: 10/05/24 1127          [id number]           magnesium oxide (Mag-Ox) tab 400 mg  Dose: 400 mg  Freq: Once Route: OR  Start: 10/07/24 0715 End: 10/07/24 0902            158781         magnesium oxide (Mag-Ox) tab 400 mg  Dose: 400 mg  Freq: Once Route: OR  Start: 10/06/24 0900 End: 10/06/24 0832           879029          Discontinued Medications   Medications 09/29 09/30 10/01 10/02 10/03 10/04 10/05 10/06 10/07 10/08   furosemide (Lasix) tab 40 mg  Dose: 40 mg  Freq: 2 times daily Route: OR  Start: 10/06/24 0900 End: 10/07/24 1410           100251     183287      014489             Vitals (since admission)    Date/Time Temp Pulse Resp BP SpO2 Weight O2 Device O2 Flow Rate (L/min) [facility]   10/08/24 1002 98.6 °F (37 °C) 85 18 121/70 92 % -- Nasal cannula 3.5 L/min GD   10/08/24 0940 -- 80 -- -- -- -- -- -- TL   10/08/24 0700 -- 88 -- -- -- -- -- -- MO    10/08/24 0621 -- -- -- -- -- 173 lb (78.5 kg) -- -- TB   10/08/24 0320 97.6 °F (36.4 °C) 78 20 147/81 93 % -- Nasal cannula 3.5 L/min TB   10/07/24 2031 97.4 °F (36.3 °C) 82 20 121/68 93 % -- Nasal cannula 3.5 L/min TB   10/07/24 1500 97.3 °F (36.3 °C) 74 20 136/73 92 % -- Nasal cannula 3.5 L/min LA   10/07/24 1005 -- -- -- -- 94 % -- Nasal cannula 3.5 L/min LA   10/07/24 1000 -- -- -- -- 88 % Abnormal  -- Nasal cannula 3 L/min LA   10/07/24 0904 97.2 °F (36.2 °C) 68 20 136/76 92 % -- Nasal cannula 3 L/min LA   10/07/24 0300 -- 61 -- -- -- -- -- -- MM   10/07/24 0254 97.7 °F (36.5 °C) 59 22 140/83 92 % 174 lb 8 oz (79.2 kg) Nasal cannula 3.5 L/min TB   10/06/24 2100 97.9 °F (36.6 °C) 66 22 150/82 91 % -- Nasal cannula 2.5 L/min TB   10/06/24 1900 -- 61 -- -- -- -- -- -- MM   10/06/24 1507 98.2 °F (36.8 °C) 56 23 129/74 92 % -- Nasal cannula 3.5 L/min RS   10/06/24 1230 -- 57 -- -- -- -- -- -- KH   10/06/24 1042 -- -- -- -- 91 % -- Nasal cannula 1.5 L/min RS   10/06/24 0824 98.3 °F (36.8 °C) 62 20 155/95 Abnormal  93 % -- Nasal cannula 2 L/min RS   10/06/24 0607 -- -- -- -- -- 177 lb 11.2 oz (80.6 kg) -- -- GAGE   10/06/24 0204 98.1 °F (36.7 °C) 55 16 147/66 94 % -- Nasal cannula 2 L/min    10/05/24 2036 98.5 °F (36.9 °C) 70 15 149/123 Abnormal  92 % -- None (Room air) -- GO   10/05/24 1857 -- -- 18 136/80 94 % -- Nasal cannula 2 L/min    10/05/24 1529 -- -- 20 171/77 Abnormal  94 % -- Nasal cannula 2 L/min    10/05/24 1341 -- -- -- -- -- 178 lb 8 oz (81 kg) -- --    10/05/24 1332 98.4 °F (36.9 °C) 77 20 162/108 Abnormal  96 % -- Nasal cannula 2 L/min    10/05/24 1230 -- 73 23 175/94 Abnormal  94 % -- Nasal cannula 2 L/min J   10/05/24 1200 -- 67 20 176/97 Abnormal  95 % -- Nasal cannula 2 L/min J   10/05/24 1115 -- 77 21 162/95 Abnormal  94 % -- Nasal cannula 2 L/min JZ   10/05/24 1030 -- 71 23 159/92 Abnormal  94 % -- Nasal cannula 2 L/min J   10/05/24 1001 -- -- -- -- 93 % -- Nasal cannula 2 L/min JZ    10/05/24 0930 -- 83 21 149/83 90 % -- None (Room air) -- ERIS   10/05/24 0922 -- 76 18 150/80 -- -- -- -- ERIS   10/05/24 0821 96.8 °F (36 °C) 88 22 152/108 Abnormal  98 % 170 lb (77.1 kg) None (Room air) -- TC

## 2024-10-08 NOTE — DISCHARGE SUMMARY
General Medicine Discharge Summary     Patient ID:  Angel Luis Alex  89 year old  7/1/1932    Admit date: 10/5/2024    Discharge date and time: 10/8/24    Attending Physician: Dilia Lofton DO     Primary Care Physician: Oscar Pittman MD     Discharge Diagnoses:   Left chest wall, thorax pain   Possible rib contusion   Alzheimers dementia   Acute on chronic systolic CHF  Atrial fibrillation/flutter  Right eye blephartitis     Discharge Condition: stable    Disposition: [facility]    Hospital Course:    89 year old male with PMH chronic systolic heart failure, Dementia living in [facility] memory care, generalized anxiety, hallucinations and delusions secondary to dementia, hyperlipidemia, pulmonary hypertension, atrial fibrillation on anticoagulation who presented from memory care with left-sided thorax pain.     Left-sided thorax pain  -reproducible on exam and worse with coughing or laughing, suspect this represents a rib contusion  -CT chest did not reveal any overt fractures  -No clear history of trauma has been reported  -Pain improved with scheduled tramadol continue   -continue scheduled tylenol      Alzheimer's dementia with behavioral disturbances  -Continue home donepezil citalopram quetiapine  -lorazepam as needed     Mild acute on chronic systolic CHF exacerbation  -Resume oral lasix 40mg daily at discharge  -give additional IV dose of lasix today (given yesterday as well)  -Continue home losartan carvedilol Lipitor  -Mild trop elevation, no symptoms consistent with acute coronary syndrome     Atrial fibrillation/flutter  -Currently rate controlled continue Coreg and Eliquis     Right eye blepharitis   -Erythrocin ointment ordered for few more days post discharge    Consults: IP CONSULT TO SOCIAL WORK    Patient instructions:        Current Discharge Medication List        START taking these medications    Details   acetaminophen 325 MG Oral Tab Take 2  tablets (650 mg total) by mouth TID & HS.      erythromycin 5 MG/GM Ophthalmic Ointment Place 1 Application into the right eye every 8 (eight) hours. 3 days, ending through 10/10/24           CONTINUE these medications which have CHANGED    Details   LORazepam 1 MG Oral Tab Take 1 tablet (1 mg total) by mouth every 4 (four) hours as needed for Anxiety. Uses topical gel 0.5ml(1mg) to lt wrist      traMADol 50 MG Oral Tab Take 1 tablet (50 mg total) by mouth in the morning and 1 tablet (50 mg total) before bedtime. Do all this for 7 days.           CONTINUE these medications which have NOT CHANGED    Details   furosemide 40 MG Oral Tab Take 1 tablet (40 mg total) by mouth daily.      donepezil 10 MG Oral Tab Take 1 tablet (10 mg total) by mouth nightly.      citalopram 20 MG Oral Tab Take 1 tablet (20 mg total) by mouth daily.      !! QUEtiapine 25 MG Oral Tab Take 1 tablet (25 mg total) by mouth 3 (three) times daily.      tetrahydrozoline 0.05 % Ophthalmic Solution Place 1 drop into the right eye as needed.      ATORVASTATIN 10 MG Oral Tab TAKE 1 TABLET BY MOUTH EVERY NIGHT      carvedilol 6.25 MG Oral Tab Take 1 tablet (6.25 mg total) by mouth 2 (two) times daily.      ELIQUIS 5 MG Oral Tab TAKE 1 TABLET(5 MG) BY MOUTH TWICE DAILY      Losartan Potassium 25 MG Oral Tab Take 1 tablet (25 mg total) by mouth 2 (two) times daily.      Cholecalciferol (VITAMIN D) 50 MCG (2000 UT) Oral Tab Take by mouth.      !! QUEtiapine 25 MG Oral Tab Take 0.5 tablets (12.5 mg total) by mouth every 6 (six) hours as needed (agitation/irritability).      aspirin 81 MG Oral Tab Take 1 tablet (81 mg total) by mouth daily.       !! - Potential duplicate medications found. Please discuss with provider.        Code Status: DNAR/Selective Treatment    Exam on day of discharge:     Vitals:    10/08/24 1002   BP: 121/70   Pulse: 85   Resp: 18   Temp: 98.6 °F (37 °C)       General: no acute distress  Heart: RRR  Lungs: clear  bilaterally  Abdomen: nontender  Extremities: no pedal edema     Total time coordinating care for discharge: Greater than 30 minutes    Dilia Lofton DO

## 2024-10-08 NOTE — CM/SW NOTE
10/08/24 1000   Discharge disposition   Expected discharge disposition subacute   [facility]   Discharge transportation [facility]       Received confirmation from liaison Jazmin rollins/ [facility] - they can accept today around 4PM.    RN Cathy and DC RN Tyler updated.    Pt's dtr Bridget update via phone- agreeable to DC plans and time.    SW spoke to [facility] (AOX1, O2) set for ETA 4PM. PCS completed in Epic - pt's RN to print w/ pt's AVS.    Room # 11-2  Report phone #: [phone]    PLAN: [facility] Ambulance ETA 4PM, PCS done        LAVELL Asif, LSW [id number]

## 2024-10-08 NOTE — CM/SW NOTE
Received [facility] confirmation from Dr. Lofton that pt is cleared for DC later today - pt is receiving one time dose of lasix.    SW sent message/update to [facility] via [facility] - pending response to confirm they can accept today. Also pending to confirm they are agreeable to 3pm discharge time.    ELVA Morgan and DC ELVA Scott updated.    PLAN: [facility], Ambulance on WC, PCS needs date - pending facility response        SW/CM to remain available for support and/or discharge planning.         Laya, MSW, LSW [id number]

## 2024-10-09 NOTE — PAYOR COMM NOTE
--------------  DISCHARGE REVIEW    Payor: [employer]  Subscriber #:  [id number]  Authorization Number: [id number]    Admit date: 10/7/24  Admit time:   4:25 PM  Discharge Date: 10/8/2024  4:44 PM     Admitting Physician: Irving Keenan DO  Attending Physician:  No att. providers found  Primary Care Physician: Oscar Pittman MD          Discharge Summary Notes        Discharge Summary signed by Dilia Lofton DO at 10/8/2024  1:46 PM       Author: Dilia Lofton DO Specialty: HOSPITALIST Author Type: Physician    Filed: 10/8/2024  1:46 PM Date of Service: 10/8/2024 10:41 AM Status: Signed    : Dilia Lofton DO (Physician)                                                              General Medicine Discharge Summary     Patient ID:  Angel Luis Alex  89 year old  7/1/1932    Admit date: 10/5/2024    Discharge date and time: 10/8/24    Attending Physician: Dilia Lofton DO     Primary Care Physician: Oscar Pittman MD     Discharge Diagnoses:   Left chest wall, thorax pain   Possible rib contusion   Alzheimers dementia   Acute on chronic systolic CHF  Atrial fibrillation/flutter  Right eye blephartitis     Discharge Condition: stable    Disposition: [facility]    Hospital Course:    89 year old male with PMH chronic systolic heart failure, Dementia living in [facility] care, generalized anxiety, hallucinations and delusions secondary to dementia, hyperlipidemia, pulmonary hypertension, atrial fibrillation on anticoagulation who presented from memory care with left-sided thorax pain.     Left-sided thorax pain  -reproducible on exam and worse with coughing or laughing, suspect this represents a rib contusion  -CT chest did not reveal any overt fractures  -No clear history of trauma has been reported  -Pain improved with scheduled tramadol continue   -continue scheduled tylenol      Alzheimer's dementia with behavioral disturbances  -Continue home donepezil citalopram quetiapine  -lorazepam as  needed     Mild acute on chronic systolic CHF exacerbation  -Resume oral lasix 40mg daily at discharge  -give additional IV dose of lasix today (given yesterday as well)  -Continue home losartan carvedilol Lipitor  -Mild trop elevation, no symptoms consistent with acute coronary syndrome     Atrial fibrillation/flutter  -Currently rate controlled continue Coreg and Eliquis     Right eye blepharitis   -Erythrocin ointment ordered for few more days post discharge    Consults: IP CONSULT TO SOCIAL WORK    Patient instructions:        Current Discharge Medication List        START taking these medications    Details   acetaminophen 325 MG Oral Tab Take 2 tablets (650 mg total) by mouth TID & HS.      erythromycin 5 MG/GM Ophthalmic Ointment Place 1 Application into the right eye every 8 (eight) hours. 3 days, ending through 10/10/24           CONTINUE these medications which have CHANGED    Details   LORazepam 1 MG Oral Tab Take 1 tablet (1 mg total) by mouth every 4 (four) hours as needed for Anxiety. Uses topical gel 0.5ml(1mg) to lt wrist      traMADol 50 MG Oral Tab Take 1 tablet (50 mg total) by mouth in the morning and 1 tablet (50 mg total) before bedtime. Do all this for 7 days.           CONTINUE these medications which have NOT CHANGED    Details   furosemide 40 MG Oral Tab Take 1 tablet (40 mg total) by mouth daily.      donepezil 10 MG Oral Tab Take 1 tablet (10 mg total) by mouth nightly.      citalopram 20 MG Oral Tab Take 1 tablet (20 mg total) by mouth daily.      !! QUEtiapine 25 MG Oral Tab Take 1 tablet (25 mg total) by mouth 3 (three) times daily.      tetrahydrozoline 0.05 % Ophthalmic Solution Place 1 drop into the right eye as needed.      ATORVASTATIN 10 MG Oral Tab TAKE 1 TABLET BY MOUTH EVERY NIGHT      carvedilol 6.25 MG Oral Tab Take 1 tablet (6.25 mg total) by mouth 2 (two) times daily.      ELIQUIS 5 MG Oral Tab TAKE 1 TABLET(5 MG) BY MOUTH TWICE DAILY      Losartan Potassium 25 MG Oral Tab  Take 1 tablet (25 mg total) by mouth 2 (two) times daily.      Cholecalciferol (VITAMIN D) 50 MCG (2000 UT) Oral Tab Take by mouth.      !! QUEtiapine 25 MG Oral Tab Take 0.5 tablets (12.5 mg total) by mouth every 6 (six) hours as needed (agitation/irritability).      aspirin 81 MG Oral Tab Take 1 tablet (81 mg total) by mouth daily.       !! - Potential duplicate medications found. Please discuss with provider.        Code Status: DNAR/Selective Treatment    Exam on day of discharge:     Vitals:    10/08/24 1002   BP: 121/70   Pulse: 85   Resp: 18   Temp: 98.6 °F (37 °C)       General: no acute distress  Heart: RRR  Lungs: clear bilaterally  Abdomen: nontender  Extremities: no pedal edema     Total time coordinating care for discharge: Greater than 30 minutes    Dilia Lofton DO      Electronically signed by Dilia Lofton DO on 10/8/2024  1:46 PM         REVIEWER COMMENTS

## 2024-10-14 ENCOUNTER — HOSPITAL ENCOUNTER (INPATIENT)
Facility: HOSPITAL | Age: 89
LOS: 3 days | Discharge: NURSING, REHAB OR LONG-TERM CARE | End: 2024-10-17
Attending: EMERGENCY MEDICINE | Admitting: INTERNAL MEDICINE
Payer: MEDICARE

## 2024-10-14 ENCOUNTER — HOSPITAL ENCOUNTER (INPATIENT)
Facility: HOSPITAL | Age: 89
LOS: 3 days | Discharge: HOSPICE | End: 2024-10-17
Attending: EMERGENCY MEDICINE | Admitting: INTERNAL MEDICINE
Payer: MEDICARE

## 2024-10-14 DIAGNOSIS — N30.01 ACUTE CYSTITIS WITH HEMATURIA: Primary | ICD-10-CM

## 2024-10-14 LAB
ALBUMIN SERPL-MCNC: 4.3 G/DL (ref 3.2–4.8)
ALBUMIN/GLOB SERPL: 1.3 {RATIO} (ref 1–2)
ALP LIVER SERPL-CCNC: 110 U/L
ALT SERPL-CCNC: 25 U/L
ANION GAP SERPL CALC-SCNC: 13 MMOL/L (ref 0–18)
AST SERPL-CCNC: 33 U/L (ref ?–34)
BASOPHILS # BLD AUTO: 0.06 X10(3) UL (ref 0–0.2)
BASOPHILS NFR BLD AUTO: 0.3 %
BILIRUB SERPL-MCNC: 1.8 MG/DL (ref 0.2–0.9)
BILIRUB UR QL CFM: NEGATIVE
BUN BLD-MCNC: 39 MG/DL (ref 9–23)
BUN/CREAT SERPL: 31.2 (ref 10–20)
CALCIUM BLD-MCNC: 10.1 MG/DL (ref 8.7–10.4)
CHLORIDE SERPL-SCNC: 104 MMOL/L (ref 98–112)
CO2 SERPL-SCNC: 27 MMOL/L (ref 21–32)
CREAT BLD-MCNC: 1.25 MG/DL
DEPRECATED RDW RBC AUTO: 52.7 FL (ref 35.1–46.3)
EGFRCR SERPLBLD CKD-EPI 2021: 54 ML/MIN/1.73M2 (ref 60–?)
EOSINOPHIL # BLD AUTO: 0.21 X10(3) UL (ref 0–0.7)
EOSINOPHIL NFR BLD AUTO: 1.2 %
ERYTHROCYTE [DISTWIDTH] IN BLOOD BY AUTOMATED COUNT: 15.3 % (ref 11–15)
GLOBULIN PLAS-MCNC: 3.3 G/DL (ref 2–3.5)
GLUCOSE BLD-MCNC: 112 MG/DL (ref 70–99)
HCT VFR BLD AUTO: 53.4 %
HGB BLD-MCNC: 17.4 G/DL
IMM GRANULOCYTES # BLD AUTO: 0.09 X10(3) UL (ref 0–1)
IMM GRANULOCYTES NFR BLD: 0.5 %
LYMPHOCYTES # BLD AUTO: 1.21 X10(3) UL (ref 1–4)
LYMPHOCYTES NFR BLD AUTO: 6.9 %
MCH RBC QN AUTO: 30.6 PG (ref 26–34)
MCHC RBC AUTO-ENTMCNC: 32.6 G/DL (ref 31–37)
MCV RBC AUTO: 93.8 FL
MONOCYTES # BLD AUTO: 1.56 X10(3) UL (ref 0.1–1)
MONOCYTES NFR BLD AUTO: 8.8 %
NEUTROPHILS # BLD AUTO: 14.53 X10 (3) UL (ref 1.5–7.7)
NEUTROPHILS # BLD AUTO: 14.53 X10(3) UL (ref 1.5–7.7)
NEUTROPHILS NFR BLD AUTO: 82.3 %
OSMOLALITY SERPL CALC.SUM OF ELEC: 308 MOSM/KG (ref 275–295)
PLATELET # BLD AUTO: 588 10(3)UL (ref 150–450)
POTASSIUM SERPL-SCNC: 4 MMOL/L (ref 3.5–5.1)
PROT SERPL-MCNC: 7.6 G/DL (ref 5.7–8.2)
RBC # BLD AUTO: 5.69 X10(6)UL
RBC #/AREA URNS AUTO: >10 /HPF
RBC #/AREA URNS AUTO: >10 /HPF
SODIUM SERPL-SCNC: 144 MMOL/L (ref 136–145)
SP GR UR STRIP: 1.01 (ref 1–1.03)
WBC # BLD AUTO: 17.7 X10(3) UL (ref 4–11)
WBC #/AREA URNS AUTO: >50 /HPF
WBC #/AREA URNS AUTO: >50 /HPF

## 2024-10-14 RX ORDER — LOSARTAN POTASSIUM 25 MG/1
25 TABLET ORAL 2 TIMES DAILY
Status: DISCONTINUED | OUTPATIENT
Start: 2024-10-14 | End: 2024-10-17

## 2024-10-14 RX ORDER — ONDANSETRON 2 MG/ML
4 INJECTION INTRAMUSCULAR; INTRAVENOUS EVERY 6 HOURS PRN
Status: DISCONTINUED | OUTPATIENT
Start: 2024-10-14 | End: 2024-10-17

## 2024-10-14 RX ORDER — CARVEDILOL 6.25 MG/1
6.25 TABLET ORAL 2 TIMES DAILY
Status: DISCONTINUED | OUTPATIENT
Start: 2024-10-14 | End: 2024-10-17

## 2024-10-14 RX ORDER — FUROSEMIDE 40 MG/1
40 TABLET ORAL DAILY
Status: DISCONTINUED | OUTPATIENT
Start: 2024-10-15 | End: 2024-10-17

## 2024-10-14 RX ORDER — DONEPEZIL HYDROCHLORIDE 10 MG/1
10 TABLET, FILM COATED ORAL NIGHTLY
Status: DISCONTINUED | OUTPATIENT
Start: 2024-10-14 | End: 2024-10-17

## 2024-10-14 RX ORDER — QUETIAPINE FUMARATE 25 MG/1
12.5 TABLET, FILM COATED ORAL EVERY 6 HOURS PRN
Status: DISCONTINUED | OUTPATIENT
Start: 2024-10-14 | End: 2024-10-17

## 2024-10-14 RX ORDER — ACETAMINOPHEN 325 MG/1
650 TABLET ORAL EVERY 6 HOURS PRN
Status: DISCONTINUED | OUTPATIENT
Start: 2024-10-14 | End: 2024-10-17

## 2024-10-14 RX ORDER — LORAZEPAM 2 MG/ML
2 INJECTION INTRAMUSCULAR ONCE
Status: COMPLETED | OUTPATIENT
Start: 2024-10-14 | End: 2024-10-14

## 2024-10-14 RX ORDER — SODIUM CHLORIDE 9 MG/ML
INJECTION, SOLUTION INTRAVENOUS CONTINUOUS
Status: DISCONTINUED | OUTPATIENT
Start: 2024-10-14 | End: 2024-10-15

## 2024-10-14 RX ORDER — ATORVASTATIN CALCIUM 10 MG/1
10 TABLET, FILM COATED ORAL NIGHTLY
Status: DISCONTINUED | OUTPATIENT
Start: 2024-10-14 | End: 2024-10-17

## 2024-10-14 RX ORDER — POLYETHYLENE GLYCOL 3350 17 G/17G
17 POWDER, FOR SOLUTION ORAL
COMMUNITY

## 2024-10-14 RX ORDER — QUETIAPINE FUMARATE 25 MG/1
25 TABLET, FILM COATED ORAL 3 TIMES DAILY
Status: DISCONTINUED | OUTPATIENT
Start: 2024-10-14 | End: 2024-10-16

## 2024-10-14 RX ORDER — ESCITALOPRAM OXALATE 10 MG/1
10 TABLET ORAL DAILY
Status: DISCONTINUED | OUTPATIENT
Start: 2024-10-15 | End: 2024-10-17

## 2024-10-14 NOTE — ED INITIAL ASSESSMENT (HPI)
Pt to ED via [facility] from [place] w/ c/o hematuria that was noticed by facilities nurses today.   +Eliquis.   A&Ox1 - Hx of dementia, combative.   Pt verbally and physically aggressive with staff, unable to obtain vitals. MD at bedside to assess pt.

## 2024-10-14 NOTE — ED PROVIDER NOTES
Patient Seen in: [facility] Emergency Department    History     Chief Complaint   Patient presents with    Hematuria       HPI    89-year-old male with a history of CHF, A-fib, on Eliquis with dementia,  from a nursing home who came with hematuria.  Patient does have DNR paperwork with his nursing home paperwork.  All history from EMS and nursing home since patient has dementia and a poor historian.    History reviewed.   Past Medical History:    Acute on chronic systolic congestive heart failure (HCC)    Chronic combined systolic and diastolic congestive heart failure (HCC)    Chronic sinus bradycardia    Chronic systolic congestive heart failure (HCC)    Coronary artery disease involving native heart without angina pectoris, unspecified vessel or lesion type    Dementia (HCC)    Elevated hemoglobin (HCC)    History of myocardial infarction    Hypertensive CHF (congestive heart failure) (HCC)    Pulmonary hypertension (HCC)       History reviewed. History reviewed. No pertinent surgical history.      Medications :  Prescriptions Prior to Admission[1]     No family history on file.    Smoking Status:   Social History     Socioeconomic History    Marital status:    Tobacco Use    Smoking status: Never    Smokeless tobacco: Never   Vaping Use    Vaping status: Never Used   Substance and Sexual Activity    Alcohol use: No     Alcohol/week: 0.0 standard drinks of alcohol    Drug use: No       Constitutional and vital signs reviewed.      Social History and Family History elements reviewed from today, pertinent positives to the presenting problem noted.    Physical Exam     ED Triage Vitals   BP 10/14/24 1817 (!) 165/106   Pulse 10/14/24 1817 97   Resp 10/14/24 1817 18   Temp 10/14/24 1817 98.2 °F (36.8 °C)   Temp src 10/14/24 1817 Temporal   SpO2 10/14/24 1817 95 %   O2 Device 10/14/24 1732 None (Room air)       All measures to prevent infection transmission during my interaction with the patient were  taken. Handwashing was performed prior to and after the exam.  Stethoscope and any equipment used during my examination was cleaned with super sani-cloth germicidal wipes following the exam.     Physical Exam  Vitals and nursing note reviewed.   Cardiovascular:      Rate and Rhythm: Normal rate.      Pulses: Normal pulses.   Pulmonary:      Effort: Pulmonary effort is normal.   Abdominal:      Palpations: Abdomen is soft.   Musculoskeletal:         General: Normal range of motion.   Skin:     General: Skin is warm and dry.      Capillary Refill: Capillary refill takes less than 2 seconds.   Neurological:      General: No focal deficit present.      Mental Status: He is alert.         ED Course        Labs Reviewed   CBC WITH DIFFERENTIAL WITH PLATELET - Abnormal; Notable for the following components:       Result Value    WBC 17.7 (*)     HCT 53.4 (*)     RDW-SD 52.7 (*)     RDW 15.3 (*)     .0 (*)     Neutrophil Absolute Prelim 14.53 (*)     Neutrophil Absolute 14.53 (*)     Monocyte Absolute 1.56 (*)     All other components within normal limits   COMP METABOLIC PANEL (14) - Abnormal; Notable for the following components:    Glucose 112 (*)     BUN 39 (*)     BUN/CREA Ratio 31.2 (*)     Calculated Osmolality 308 (*)     eGFR-Cr 54 (*)     Bilirubin, Total 1.8 (*)     All other components within normal limits   URINALYSIS WITH CULTURE REFLEX - Abnormal; Notable for the following components:    Urine Color Red (*)     Clarity Urine Cloudy (*)     WBC Urine >50 (*)     RBC Urine >10 (*)     Bacteria Urine 2+ (*)     All other components within normal limits   UA MICROSCOPIC ONLY, URINE - Abnormal; Notable for the following components:    WBC Urine >50 (*)     RBC Urine >10 (*)     Bacteria Urine 2+ (*)     All other components within normal limits   ICTOTEST - Normal   URINE CULTURE, ROUTINE       As Interpreted by me    Imaging Results Available and Reviewed while in ED: No results found.  ED Medications  Administered:   Medications   LORazepam (Ativan) 2 mg/mL injection 2 mg (2 mg Intramuscular Given 10/14/24 1742)   OLANZapine (Zyprexa) 10 mg in sterile water for injection (PF) IM injection (10 mg Intramuscular Given 10/14/24 1835)   cefTRIAXone (Rocephin) 2 g in sodium chloride 0.9% 100 mL IVPB-ADDV (2 g Intravenous New Bag 10/14/24 2022)         MDM     Vitals:    10/14/24 1817 10/14/24 2000   BP: (!) 165/106 118/68   Pulse: 97 87   Resp: 18    Temp: 98.2 °F (36.8 °C)    TempSrc: Temporal    SpO2: 95% 96%     *I personally reviewed and interpreted all ED vitals.    Pulse Ox:  , Room air, Normal     Monitor Interpretation:   normal sinus rhythm as interpreted by me.  The cardiac monitor was ordered to monitor cardiac rate and rhythm.    Differential Diagnosis/ Diagnostic Considerations: UTI, hematuria, hypercoagulable state    Complicating Factors: The patient already has does not have any pertinent problems on file. to contribute to the complexity of this ED evaluation.    Medical Decision Making  Amount and/or Complexity of Data Reviewed  Independent Historian:      Details: Nursing home records, EMS  External Data Reviewed: notes.     Details: Reviewed previous ER visits along with outpatient neurology visits to help get history and medication list not documented admission records since patient has dementia and unable to get history out of the patient  Labs: ordered. Decision-making details documented in ED Course.    Risk  Decision regarding hospitalization.    Critical Care  Total time providing critical care: 30 minutes        Patient has severe dementia and is fighting status and swinging trying to hit and kick staff.  Will need to chemically sedate to evaluate patient.    Reviewed all results with patient.  Patient does have UTI causing hematuria with elevated WBC count of 17,000.  Given IV antibiotics and will need to admit for further evaluation.    Discussed case with urology Dr. Ho via [employer]  and notified of    Discussed case with Dr. Butler who accepts admission  Condition upon leaving the department: Stable    Disposition and Plan     Clinical Impression:  1. Acute cystitis with hematuria        Disposition:  Admit    Follow-up:  No follow-up provider specified.    Medications Prescribed:  Current Discharge Medication List          Hospital Problems       Present on Admission  Date Reviewed: 4/7/2022            ICD-10-CM Noted POA    * (Principal) Acute cystitis with hematuria N30.01 10/14/2024 Unknown                       [1] (Not in a hospital admission)

## 2024-10-15 LAB
ALBUMIN SERPL-MCNC: 4 G/DL (ref 3.2–4.8)
ALP LIVER SERPL-CCNC: 105 U/L
ALT SERPL-CCNC: 20 U/L
ANION GAP SERPL CALC-SCNC: 6 MMOL/L (ref 0–18)
AST SERPL-CCNC: 26 U/L (ref ?–34)
BILIRUB DIRECT SERPL-MCNC: 0.6 MG/DL (ref ?–0.3)
BILIRUB SERPL-MCNC: 1.7 MG/DL (ref 0.2–0.9)
BUN BLD-MCNC: 31 MG/DL (ref 9–23)
BUN/CREAT SERPL: 29.2 (ref 10–20)
CALCIUM BLD-MCNC: 9.5 MG/DL (ref 8.7–10.4)
CHLORIDE SERPL-SCNC: 108 MMOL/L (ref 98–112)
CO2 SERPL-SCNC: 31 MMOL/L (ref 21–32)
CREAT BLD-MCNC: 1.06 MG/DL
DEPRECATED RDW RBC AUTO: 51.8 FL (ref 35.1–46.3)
EGFRCR SERPLBLD CKD-EPI 2021: 66 ML/MIN/1.73M2 (ref 60–?)
ERYTHROCYTE [DISTWIDTH] IN BLOOD BY AUTOMATED COUNT: 15.2 % (ref 11–15)
GLUCOSE BLD-MCNC: 101 MG/DL (ref 70–99)
HCT VFR BLD AUTO: 50.9 %
HGB BLD-MCNC: 16.9 G/DL
MCH RBC QN AUTO: 30.7 PG (ref 26–34)
MCHC RBC AUTO-ENTMCNC: 33.2 G/DL (ref 31–37)
MCV RBC AUTO: 92.5 FL
OSMOLALITY SERPL CALC.SUM OF ELEC: 307 MOSM/KG (ref 275–295)
PLATELET # BLD AUTO: 578 10(3)UL (ref 150–450)
POTASSIUM SERPL-SCNC: 4.1 MMOL/L (ref 3.5–5.1)
PROT SERPL-MCNC: 7 G/DL (ref 5.7–8.2)
RBC # BLD AUTO: 5.5 X10(6)UL
SODIUM SERPL-SCNC: 145 MMOL/L (ref 136–145)
WBC # BLD AUTO: 21.2 X10(3) UL (ref 4–11)

## 2024-10-15 RX ORDER — DOCUSATE SODIUM 100 MG/1
100 CAPSULE, LIQUID FILLED ORAL 2 TIMES DAILY
COMMUNITY

## 2024-10-15 RX ORDER — LORAZEPAM 1 MG/1
1 TABLET ORAL EVERY 4 HOURS PRN
Status: DISCONTINUED | OUTPATIENT
Start: 2024-10-15 | End: 2024-10-17

## 2024-10-15 RX ORDER — SODIUM CHLORIDE 9 MG/ML
INJECTION, SOLUTION INTRAVENOUS CONTINUOUS
Status: ACTIVE | OUTPATIENT
Start: 2024-10-15 | End: 2024-10-15

## 2024-10-15 RX ORDER — DOCUSATE SODIUM 100 MG/1
100 CAPSULE, LIQUID FILLED ORAL 2 TIMES DAILY
Status: DISCONTINUED | OUTPATIENT
Start: 2024-10-15 | End: 2024-10-17

## 2024-10-15 RX ORDER — TETRAHYDROZOLINE HCL 0.05 %
1 DROPS OPHTHALMIC (EYE) 3 TIMES DAILY
Status: DISCONTINUED | OUTPATIENT
Start: 2024-10-15 | End: 2024-10-17

## 2024-10-15 NOTE — ED QUICK NOTES
Orders for admission, patient is aware of plan and ready to go upstairs. Any questions, please call ED RN dee at extension [phone].     Patient Covid vaccination status: Unvaccinated     COVID Test Ordered in ED: None    COVID Suspicion at Admission: N/A    Running Infusions:  None    Mental Status/LOC at time of transport: x1    Other pertinent information:   CIWA score: N/A   NIH score:  N/A

## 2024-10-15 NOTE — CM/SW NOTE
10/15/24 1000   CM/SW Referral Data   Referral Source Social Work (self-referral)   Reason for Referral Discharge planning;Readmission   Informant EMR;Clinical Staff Member   Readmission Assessment   Factors that patient feels contributed to this readmission Acute/Chronic Clinical Presentation   Pt's living situation prior to admission? Subacute rehab   Pt's level of independence at discharge? Total assist (max)   Pt. received education on diagnoses at time of discharge? Yes   Was full assessment completed by SW/GABO on prior admission? Yes   Was the recommended discharge plan achieved? Yes   Was pt. discharged w/out services? No   Medical Hx   Does patient have an established PCP? Yes  (Dr. Kael Clarke)   Significant Past Medical/Mental Health Hx CHF; dementia   Patient Info   Advanced directives? Yes   Patient's Current Mental Status at Time of Assessment Confused or unable to complete assessment   Patient's Home Environment Memory Care Facility   Post Acute Care Provider Upon Admission   ([facility])   Number of Levels in Home 1   Patient lives with Other  (Staff 24/7)   Patient Status Prior to Admission   Independent with ADLs and Mobility No   Pt. requires assistance with Housework;Driving;Meals;Bathing;Ambulating;Medications;Dressing;Toileting;Feeding;Finances   Services in place prior to admission DME/Supplies at home   Type of DME/Supplies Standard Walker   Discharge Needs   Anticipated D/C needs Hospice     DALTON received MD order for hospice/discharge planning.    Patient is a readmission from [facility] for UTI.  Patient is agitated and in restraints.    Per NP Kacie, plan is for paitent to return to [facility] w/hospice then transition to home.    DALTON called DOMINICK Betancourt w/[facility] who confirmed they can take patient back w/hospice.  Ana requested hospice order and she will set it up.    DALTON spoke to Ana and informed her DC planned for tomorrow.    PLAN: DC to [facility]  w/hospice    / to remain available for support and/or discharge planning.      Belkis Conley MSW, LSW [phone]

## 2024-10-15 NOTE — H&P
[facility]   Hospitalist Team  History and Physical  Admit Date:  10/14/2024    Is this a shared or split note between Advanced Practice Provider and Physician? Yes    ASSESSMENT / PLAN:   89 year old male with PMH chronic systolic heart failure, Dementia living in [facility], generalized anxiety, hallucinations and delusions secondary to dementia, hyperlipidemia, pulmonary hypertension, atrial fibrillation on anticoagulation with recent admission from [facility] center admitted with acute on chronic HFrEF and left sided thorax pain and discharged to [place] who presents with hematuria and leukocytosis, Wesley placed.  Discussed patient's current medical conditions with daughter Bridget who is medical power of , she was opted to see if [facility] will take patient back under hospice care, social work consulted,see below for details     Hematuria   Leukocytosis   Right Eye Blepharitis  -tmax 99. WBC 17.1-->21.2  -urine with blood, cx pending  -blood cx pending   -holding asa and eliquis  -IVF   -continue erythromycin eye drops and cefriaxone   -urology consult      Left-sided thorax pain-noted last admission, no current complaints   -reproducible on exam and worse with coughing or laughing, suspect this represents a rib contusion     Alzheimer's dementia with behavioral disturbances  -s/p ativan and zyprexa on admission  -Continue home donepezil citalopram quetiapine  -lorazepam and seroquel as needed  -prn restraints      Chronic HFrEF- EF 45%  CAD previous MI  HL  Persistent Atrial Fibrillation/Atrial Flutter   -2022 echo with EF 45%, mild AI  -Continue home losartan carvedilol Lipitor and lasix   -holding eliquis      GOC  -DNR/[facility], POLST in Chart, confirmed with daughter Bridget  -plans for hospice at [facility]     MA/RACQUEL Reach  -ER Visits 2024: 3  -Admissions 2024: 2  -7 Day Re- Entry: [facility] 10/ 8-->[place]   -Consults: urology   -Discharge Needs:[facility] [place] with hospice with plans to transition to home hospice at time is closer   -Appointments: [ ] PCP Oscar Pittman MD    FEN  -lytes in am  -IVF  -diet-general diet with thin liquids, no straw    Prophy  -SCD  -holding AC with hematuria     Dispo  -discharge back to [facility] with Hospice, ~EDoD 10/16-10/17  PCP: Oscar Pittman MD       Outpatient records or previous hospital records reviewed.   Further recommendations pending patient's clinical course.  [facility] hospitalist  team to continue to follow patient while in house  Concerns regarding plan of care were discussed with patient. Patient agrees with plan as detailed above. Discussed plan of care with Dr. Martinez    Note: This chart was prepared using voice recognition software and may contain unintended word substitution errors.     Kacie Galeana RN, NP  [facility] Hospitalist Team   Available via Perfect Serve or Bubble Chat (check Availability)    10/15/2024      HISTORY:   CC:   Chief Complaint   Patient presents with    Hematuria        PCP: Oscar Pittman MD    History of Present Illness:     89 year old male with PMH chronic systolic heart failure, Dementia living in [facility] memory care, generalized anxiety, hallucinations and delusions secondary to dementia, hyperlipidemia, pulmonary hypertension, atrial fibrillation on anticoagulation with recent admission from memory care center admitted with acute on chronic HFrEF and left sided thorax pain and discharged to [place]    Patient calm and in restraints.  He is able to tell me his name is Angel Luis, date and year he was born as well as his daughter's Bridget.  He thinks he is in [place].  He is not sure why he is in the hospital.  Patient apparently was agitated last night and did get Ativan and Zyprexa.    Per conversation with daughter Bridget patient has not been able to progress much at rehab as a result of his agitation and cognitive issues, they have been needing to give him numerous  medications.  He is on aspirin and Eliquis and was found to have hematuria and currently has a Ordoñez in place with no evidence of blood clots.  On arrival patient was noted to have leukocytosis with a white count of 21 with a low-grade temp of 99.  He was being treated for right eye blepharitis and currently on ceftriaxone for possible UTI.    Discussed patient's current medical conditions as well as reviewed patient's wishes with daughter.  Decision was made to see if [facility] care can take him back under hospice care, social work consulted.      Review of Systems  12 point systems reviewed, please see HPI for pertinent positives, otherwise negative    OBJECTIVE:  /70 (BP Location: Right arm)   Pulse 83   Temp 99.9 °F (37.7 °C) (Axillary)   Resp 21   Wt 173 lb 6.4 oz (78.7 kg)   SpO2 99%   BMI 24.88 kg/m²     GENERAL: CALM AND IN RESTRAINTS   NEUROLOGIC: A/A; Ox1  RESPIRATORY: normal expansion; non labored, CTA   CARDIOVASCULAR: IRREGULAR   ABDOMEN:  Soft, BS+; non distended, non tender    GENITAL/: ORDOÑEZ WITH BLOOD, NO CLOTS  EXTREMITIES: no LE edema    PMH  Past Medical History:    Acute on chronic systolic congestive heart failure (HCC)    Anxiety    Chronic combined systolic and diastolic congestive heart failure (HCC)    Chronic sinus bradycardia    Chronic systolic congestive heart failure (HCC)    Coronary artery disease involving native heart without angina pectoris, unspecified vessel or lesion type    Dementia (HCC)    Dementia (HCC)    Elevated hemoglobin (HCC)    High blood pressure    History of myocardial infarction    Hyperlipidemia    Hypertensive CHF (congestive heart failure) (HCC)    Paroxysmal A-fib (HCC)    Pulmonary hypertension (HCC)        PSH  History reviewed. No pertinent surgical history.     ALL:  Allergies[1]     Home Medications:  Medications Taking[2]    Soc Hx  Social History     Tobacco Use    Smoking status: Never    Smokeless tobacco: Never   Substance Use  Topics    Alcohol use: No     Alcohol/week: 0.0 standard drinks of alcohol        Fam Hx  No family history on file.      DIAGNOSTIC DATA:   CBC/Chem  Recent Labs   Lab 10/14/24  1852 10/15/24  0559   WBC 17.7* 21.2*   HGB 17.4 16.9   MCV 93.8 92.5   .0* 578.0*       Recent Labs   Lab 10/14/24  1852 10/15/24  0559    145   K 4.0 4.1    108   CO2 27.0 31.0   BUN 39* 31*   CREATSERUM 1.25 1.06   * 101*   CA 10.1 9.5       Recent Labs   Lab 10/14/24  1852 10/15/24  0559   ALT 25 20   AST 33 26   ALB 4.3 4.0         SEE ATTENDING NOTE BELOW    Patient seen and examined independently.  Discussed with APN and agree with note above.      HPI Patient is a 89 year old male with PMH chronic systolic heart failure, Dementia living in [facility], generalized anxiety, hallucinations and delusions secondary to dementia, hyperlipidemia, pulmonary hypertension, atrial fibrillation on anticoagulation with recent admission from [facility] admitted with acute on chronic HFrEF and left sided thorax pain and discharged to [place] who presents with hematuria and leukocytosis, Ordoñez placed.  Discussed patient's current medical conditions with daughter Bridget who is medical power of , she was opted to see if [facility] will take patient back under hospice care     objective  /68 (BP Location: Right arm)   Pulse 80   Temp 98.4 °F (36.9 °C) (Oral)   Resp 20   Wt 173 lb 6.4 oz (78.7 kg)   SpO2 100%   BMI 24.88 kg/m²      GENERAL: CALM AND IN RESTRAINTS   NEUROLOGIC: A/A; Ox1  RESPIRATORY: normal expansion; non labored, CTA   CARDIOVASCULAR: IRREGULAR   ABDOMEN:  Soft, BS+; non distended, non tender    GENITAL/: ORDOÑEZ WITH BLOOD, NO CLOTS  EXTREMITIES: no LE edema    Assessment and Plan  Hematuria   Leukocytosis   Right Eye Blepharitis  -tmax 99. WBC 17.1-->21.2  -urine with blood, cx pending  -blood cx pending   -holding asa and eliquis  -IVF   -continue  erythromycin eye drops and cefriaxone   -urology consult      Left-sided thorax pain-noted last admission, no current complaints   -reproducible on exam and worse with coughing or laughing, suspect this represents a rib contusion     Alzheimer's dementia with behavioral disturbances  -s/p ativan and zyprexa on admission  -Continue home donepezil citalopram quetiapine  -lorazepam and seroquel as needed  -prn restraints      Chronic HFrEF- EF 45%  CAD previous MI  HL  Persistent Atrial Fibrillation/Atrial Flutter   -2022 echo with EF 45%, mild AI  -Continue home losartan carvedilol Lipitor and lasix   -holding eliquis     Rest as above with above    [facility] Hospitalist   Bandar Martinez MD            [1] No Known Allergies  [2]   Outpatient Medications Marked as Taking for the 10/14/24 encounter (Hospital Encounter)   Medication Sig Dispense Refill    docusate sodium 100 MG Oral Cap Take 1 capsule (100 mg total) by mouth 2 (two) times daily.      Polyethylene Glycol 3350 17 g Oral Powd Pack Take 17 g by mouth daily as needed (constipation).      LORazepam 1 MG Oral Tab Take 1 tablet (1 mg total) by mouth every 4 (four) hours as needed for Anxiety. Uses topical gel 0.5ml(1mg) to lt wrist 30 tablet 0    acetaminophen 325 MG Oral Tab Take 2 tablets (650 mg total) by mouth TID & HS.      furosemide 40 MG Oral Tab Take 1 tablet (40 mg total) by mouth daily.      donepezil 10 MG Oral Tab Take 1 tablet (10 mg total) by mouth nightly.      citalopram 20 MG Oral Tab Take 1 tablet (20 mg total) by mouth daily.      QUEtiapine 25 MG Oral Tab Take 0.5 tablets (12.5 mg total) by mouth every 6 (six) hours as needed (agitation/irritability).      QUEtiapine 25 MG Oral Tab Take 1 tablet (25 mg total) by mouth 3 (three) times daily.      tetrahydrozoline 0.05 % Ophthalmic Solution Place 1 drop into the right eye as needed.      ATORVASTATIN 10 MG Oral Tab TAKE 1 TABLET BY MOUTH EVERY NIGHT 90 tablet 3    carvedilol 6.25 MG  Oral Tab Take 1 tablet (6.25 mg total) by mouth 2 (two) times daily. 180 tablet 3    ELIQUIS 5 MG Oral Tab TAKE 1 TABLET(5 MG) BY MOUTH TWICE DAILY 180 tablet 3    Losartan Potassium 25 MG Oral Tab Take 1 tablet (25 mg total) by mouth 2 (two) times daily. 180 tablet 3    Cholecalciferol (VITAMIN D) 50 MCG (2000 UT) Oral Tab Take by mouth.      aspirin 81 MG Oral Tab Take 1 tablet (81 mg total) by mouth daily.

## 2024-10-15 NOTE — PLAN OF CARE
Problem: Safety Risk - Non-Violent Restraints  Goal: Patient will remain free from self-harm  Description: INTERVENTIONS:  - Apply the least restrictive restraint to prevent harm  - Notify patient and family of reasons restraints applied  - Assess for any contributing factors to confusion (electrolyte disturbances, delirium, medications)  - Discontinue any unnecessary medical devices as soon as possible  - Assess the patient's physical comfort, circulation, skin condition, hydration, nutrition and elimination needs   - Reorient and redirection as needed  - Assess for the need to continue restraints  Outcome: Progressing     Problem: Patient Centered Care  Goal: Patient preferences are identified and integrated in the patient's plan of care  Description: Interventions:  - What would you like us to know as we care for you?   - Provide timely, complete, and accurate information to patient/family  - Incorporate patient and family knowledge, values, beliefs, and cultural backgrounds into the planning and delivery of care  - Encourage patient/family to participate in care and decision-making at the level they choose  - Honor patient and family perspectives and choices  Outcome: Progressing     Problem: Patient/Family Goals  Goal: Patient/Family Long Term Goal  Description: Patient's Long Term Goal: Monitor blood in urine     Interventions:  -- Monitor vitals  - Monitor appropriate labs  - Administer medications as ordered  - Follow MD's orders  - Update patient on plan of care   - Discharge planning   -monitor hgb  - See additional Care Plan goals for specific interventions  Outcome: Progressing  Goal: Patient/Family Short Term Goal  Description: Patient's Short Term Goal: remove restraints    Interventions:   - Monitor vitals  - Monitor appropriate labs  - Administer medications as ordered  - Follow MD's orders  - Update patient on plan of care   - Discharge planning   - See additional Care Plan goals for specific  interventions  Outcome: Progressing     Problem: METABOLIC/FLUID AND ELECTROLYTES - ADULT  Goal: Electrolytes maintained within normal limits  Description: INTERVENTIONS:  - Monitor labs and rhythm and assess patient for signs and symptoms of electrolyte imbalances  - Administer electrolyte replacement as ordered  - Monitor response to electrolyte replacements, including rhythm and repeat lab results as appropriate  - Fluid restriction as ordered  - Instruct patient on fluid and nutrition restrictions as appropriate  Outcome: Progressing  Goal: Hemodynamic stability and optimal renal function maintained  Description: INTERVENTIONS:  - Monitor labs and assess for signs and symptoms of volume excess or deficit  - Monitor intake, output and patient weight  - Monitor urine specific gravity, serum osmolarity and serum sodium as indicated or ordered  - Monitor response to interventions for patient's volume status, including labs, urine output, blood pressure (other measures as available)  - Encourage oral intake as appropriate  - Instruct patient on fluid and nutrition restrictions as appropriate  Outcome: Progressing     Problem: HEMATOLOGIC - ADULT  Goal: Maintains hematologic stability  Description: INTERVENTIONS  - Assess for signs and symptoms of bleeding or hemorrhage  - Monitor labs and vital signs for trends  - Administer supportive blood products/factors, fluids and medications as ordered and appropriate  - Administer supportive blood products/factors as ordered and appropriate  Outcome: Progressing     Problem: MUSCULOSKELETAL - ADULT  Goal: Return mobility to safest level of function  Description: INTERVENTIONS:  - Assess patient stability and activity tolerance for standing, transferring and ambulating w/ or w/o assistive devices  - Assist with transfers and ambulation using safe patient handling equipment as needed  - Ensure adequate protection for wounds/incisions during mobilization  - Obtain PT/OT consults  as needed  - Advance activity as appropriate  - Communicate ordered activity level and limitations with patient/family  Outcome: Progressing     Problem: NEUROLOGICAL - ADULT  Goal: Achieves stable or improved neurological status  Description: INTERVENTIONS  - Assess for and report changes in neurological status  - Initiate measures to prevent increased intracranial pressure  - Maintain blood pressure and fluid volume within ordered parameters to optimize cerebral perfusion and minimize risk of hemorrhage  - Monitor temperature, glucose, and sodium. Initiate appropriate interventions as ordered  Outcome: Progressing

## 2024-10-15 NOTE — SLP NOTE
ADULT SWALLOWING EVALUATION    ASSESSMENT    ASSESSMENT/OVERALL IMPRESSION:  PPE REQUIRED. THIS THERAPIST WORE GLOVES FOR DURATION OF EVALUATION. HANDS WASHED UPON ENTRANCE/EXIT.    Per MD H&P, \"89 year old male with PMH chronic systolic heart failure, Dementia living in [facility], generalized anxiety, hallucinations and delusions secondary to dementia, hyperlipidemia, pulmonary hypertension, atrial fibrillation on anticoagulation with recent admission from [facility] admitted with acute on chronic HFrEF and left sided thorax pain and discharged to [place] who presents with hematuria and leukocytosis, Wesley placed.  Discussed patient's current medical conditions with daughter Bridget who is medical power of , she was opted to see if [facility] will take patient back under hospice care, social work consulted\"    SLP BSSE orders received and acknowledged. A swallow evaluation warranted per RN dysphagia screen. Pt afebrile with clear/weak vocal quality, on room air, with oxygen saturation at 100%. Pt with no hx of dysphagia at [facility].   Pt positioned 90 degrees in bed, alert/cooperative/restraints. Pt with no complaints of pain. Oral motor examination revealed reduced strength, ROM, and rate of motion. Pt presented with trials of hard solids, puree and thin liquids via straw/cup. Pt with adequate oral acceptance and reduced bilabial seal, anterior spillage of thin liquids. Pt with intact bite, prolonged mastication of solids, and delayed A-P transit. Pt's swallow response appears delayed with reduced hyolaryngeal elevation/excursion. Delayed cough observed with thin liquids via straw. No clinical signs of aspiration (e.g., immediate/delayed throat clear, immediate/delayed cough, wet vocal quality, increased O2 effort) observed across hard solids, puree and thin liquid via cup trials. No CXR on this admission. Oxygen status remained stable t/o the entire evaluation.     At  this time, pt presents with mild oral dysphagia and probable pharyngeal dysfunction. Recommend a regular diet and thin liquids with strict adherence to safe swallowing compensatory strategies. Results and recommendations reviewed with RN, pt. Pt unable to v/u to all results/recommendations, no family present. Recommendations remain written on whiteboard. SLP collaborated with RN for MD diet orders.     PLAN: SLP to f/u x1-2 meal assessments, monitor imaging, and VFSS if clinically indicated       RECOMMENDATIONS   Diet Recommendations - Solids: Regular  Diet Recommendations - Liquids: Thin Liquids                    Compensatory Strategies Recommended: No straws;Slow rate;Small bites and sips  Aspiration Precautions: Upright position;Slow rate;Small bites and sips;No straw  Medication Administration Recommendations:  (as tolerated)  Treatment Plan/Recommendations: Aspiration precautions    HISTORY   MEDICAL HISTORY  Reason for Referral: RN dysphagia screen    Problem List  Principal Problem:    Acute cystitis with hematuria      Past Medical History  Past Medical History:    Acute on chronic systolic congestive heart failure (HCC)    Anxiety    Chronic combined systolic and diastolic congestive heart failure (HCC)    Chronic sinus bradycardia    Chronic systolic congestive heart failure (HCC)    Coronary artery disease involving native heart without angina pectoris, unspecified vessel or lesion type    Dementia (HCC)    Dementia (HCC)    Elevated hemoglobin (HCC)    High blood pressure    History of myocardial infarction    Hyperlipidemia    Hypertensive CHF (congestive heart failure) (HCC)    Paroxysmal A-fib (HCC)    Pulmonary hypertension (HCC)       Prior Living Situation: Skilled nursing facility  Diet Prior to Admission: Regular;Thin liquids  Precautions: Restraints    Patient/Family Goals: did not state    SWALLOWING HISTORY  Current Diet Consistency: NPO  Dysphagia History: none  Imaging Results:  none    SUBJECTIVE       OBJECTIVE   ORAL MOTOR EXAMINATION        Strength:  (reduced)     Range of Motion:  (reduced)  Rate of Motion: Reduced    Voice Quality: Clear;Weak  Respiratory Status: Unlabored  Consistencies Trialed: Thin liquids;Hard solid;Puree  Method of Presentation: Staff/Clinician assistance;Cup;Straw;Spoon  Patient Positioning: Upright;Midline    Oral Phase of Swallow: Impaired  Bolus Retrieval: Intact  Bilabial Seal: Impaired  Bolus Formation: Impaired  Bolus Propulsion: Impaired  Mastication: Impaired  Retention: Intact    Pharyngeal Phase of Swallow: Impaired  Laryngeal Elevation Timing: Appears impaired  Laryngeal Elevation Strength: Appears impaired  Laryngeal Elevation Coordination: Appears intact  (Please note: Silent aspiration cannot be evaluated clinically. Videofluoroscopic Swallow Study is required to rule-out silent aspiration.)    Esophageal Phase of Swallow: No complaints consistent with possible esophageal involvement  Comments: NA          GOALS  Goal #1 The patient will tolerate regular consistency and thin liquids without overt signs or symptoms of aspiration with 100 % accuracy over 1-2 session(s).  In Progress   Goal #2 The patient/family/caregiver will demonstrate understanding and implementation of aspiration precautions and swallow strategies independently over 1-2 session(s).    In Progress   Goal #3 The patient will utilize compensatory strategies as outlined by  BSSE (clinical evaluation) including Slow rate, Small bites, Small sips, No straws, Upright 90 degrees, Upright 90 degrees 30 mins after meal, Eliminate distractions, Supervision with meals with PRN assistance 100 % of the time across 1-2 sessions.  In Progress     FOLLOW UP  Treatment Plan/Recommendations: Aspiration precautions  Number of Visits to Meet Established Goals:  (1-2)  Follow Up Needed (Documentation Required): Yes  SLP Follow-up Date: 10/16/24    Thank you for your referral.   If you have any  questions, please contact HERMAN Haddad M.S. CCC-SLP  Speech Language Pathologist  Phone Number [phone]

## 2024-10-15 NOTE — OCCUPATIONAL THERAPY NOTE
Orders received via functional mobility screen, chart reviewed. Plan is for patient to return to memory care on hospice. Will complete orders.      Meena Bell OT  [facility]  Inpatient Rehabilitation  Occupational Therapy  [phone]

## 2024-10-15 NOTE — ED QUICK NOTES
Pt transported with this RN and ED tech to assigned inpatient room. Pt respirations and equal and unlabored. Pt's daughter at bedside with patient.

## 2024-10-15 NOTE — PLAN OF CARE
Problem: Safety Risk - Non-Violent Restraints  Goal: Patient will remain free from self-harm  Description: INTERVENTIONS:  - Apply the least restrictive restraint to prevent harm  - Notify patient and family of reasons restraints applied  - Assess for any contributing factors to confusion (electrolyte disturbances, delirium, medications)  - Discontinue any unnecessary medical devices as soon as possible  - Assess the patient's physical comfort, circulation, skin condition, hydration, nutrition and elimination needs   - Reorient and redirection as needed  - Assess for the need to continue restraints  Outcome: Progressing     Problem: Patient Centered Care  Goal: Patient preferences are identified and integrated in the patient's plan of care  Description: Interventions:  - What would you like us to know as we care for you?   - Provide timely, complete, and accurate information to patient/family  - Incorporate patient and family knowledge, values, beliefs, and cultural backgrounds into the planning and delivery of care  - Encourage patient/family to participate in care and decision-making at the level they choose  - Honor patient and family perspectives and choices  Outcome: Progressing     Problem: Patient/Family Goals  Goal: Patient/Family Long Term Goal  Description: Patient's Long Term Goal: Infection resolved.    Interventions:  - Vital signs taken and stable.  - Patient is afebrile at current time.  - Patient receives intravenous antibiotics per order.  - See additional Care Plan goals for specific interventions  Outcome: Progressing  Goal: Patient/Family Short Term Goal  Description: Patient's Short Term Goal: Safe & free from falls.    Interventions:   - Fall risk precautions are followed at all times.  - Non-skid socks are worn at all times.  - Call light within reach at all times.  - Frequent rounds are done to monitor for safety.  - Camera in place to monitor for safety.  - See additional Care Plan goals  for specific interventions  Outcome: Progressing     Problem: METABOLIC/FLUID AND ELECTROLYTES - ADULT  Goal: Electrolytes maintained within normal limits  Description: INTERVENTIONS:  - Monitor labs and rhythm and assess patient for signs and symptoms of electrolyte imbalances  - Administer electrolyte replacement as ordered  - Monitor response to electrolyte replacements, including rhythm and repeat lab results as appropriate  - Fluid restriction as ordered  - Instruct patient on fluid and nutrition restrictions as appropriate  Outcome: Progressing  Goal: Hemodynamic stability and optimal renal function maintained  Description: INTERVENTIONS:  - Monitor labs and assess for signs and symptoms of volume excess or deficit  - Monitor intake, output and patient weight  - Monitor urine specific gravity, serum osmolarity and serum sodium as indicated or ordered  - Monitor response to interventions for patient's volume status, including labs, urine output, blood pressure (other measures as available)  - Encourage oral intake as appropriate  - Instruct patient on fluid and nutrition restrictions as appropriate  Outcome: Progressing     Problem: HEMATOLOGIC - ADULT  Goal: Maintains hematologic stability  Description: INTERVENTIONS  - Assess for signs and symptoms of bleeding or hemorrhage  - Monitor labs and vital signs for trends  - Administer supportive blood products/factors, fluids and medications as ordered and appropriate  - Administer supportive blood products/factors as ordered and appropriate  Outcome: Progressing     Problem: MUSCULOSKELETAL - ADULT  Goal: Return mobility to safest level of function  Description: INTERVENTIONS:  - Assess patient stability and activity tolerance for standing, transferring and ambulating w/ or w/o assistive devices  - Assist with transfers and ambulation using safe patient handling equipment as needed  - Ensure adequate protection for wounds/incisions during mobilization  - Obtain  PT/OT consults as needed  - Advance activity as appropriate  - Communicate ordered activity level and limitations with patient/family  Outcome: Progressing     Problem: NEUROLOGICAL - ADULT  Goal: Achieves stable or improved neurological status  Description: INTERVENTIONS  - Assess for and report changes in neurological status  - Initiate measures to prevent increased intracranial pressure  - Maintain blood pressure and fluid volume within ordered parameters to optimize cerebral perfusion and minimize risk of hemorrhage  - Monitor temperature, glucose, and sodium. Initiate appropriate interventions as ordered  Outcome: Progressing     Patient is presently resting in the bed. Alert x 1. Vital signs taken and stable. Fall risk precautions are followed at all times. Galarza is in place. Patient has blood tinged urine output. Urology on consult to see patient. Patient was seen by Speech therapist on today per consult. Currently on Regular diet with thin liquids and no straws. Patient is assisted with all feedings for safety. Patient has periods of agitation and impulsive behavior. Bilateral soft wrist restraints in place for safety. Patient has adequate circulation, sensation, and movement to bilateral wrist site. Patient attempts to try to pull at galarza catheter site and is redirected for safety. Intravenous fluids infusing and tolerated. Patient was assisted with feeding by this RN for breakfast and ate 80% of meal. Call light within reach at all times.

## 2024-10-15 NOTE — CONSULTS
[facility]  Report of Urology Consultation    Angel Luis Alex Patient Status:  Inpatient    1932 MRN [id number]   Location [facility] 5SW/SE Attending Bandar Martinez MD   Hosp Day # 1 PCP Oscar Pittman MD     Reason for Consultation:  Hematuria    History of Present Illness:  Angel Luis Alex is a a(n) 89 year old male. He has dementia, CHF, atrial fibrillation, and is on Eliquis. He was brought from the nursing home for hematuria. His UA is consistent with a UTI. His WBC was 17. His daughter does not think he had a fever. The patient has been started on ceftriaxone and his urine culture is growing gram negative rods.     History:  Past Medical History:    Acute on chronic systolic congestive heart failure (HCC)    Anxiety    Chronic combined systolic and diastolic congestive heart failure (HCC)    Chronic sinus bradycardia    Chronic systolic congestive heart failure (HCC)    Coronary artery disease involving native heart without angina pectoris, unspecified vessel or lesion type    Dementia (HCC)    Dementia (HCC)    Elevated hemoglobin (HCC)    High blood pressure    History of myocardial infarction    Hyperlipidemia    Hypertensive CHF (congestive heart failure) (HCC)    Paroxysmal A-fib (HCC)    Pulmonary hypertension (HCC)     History reviewed. No pertinent surgical history.  No family history on file.   reports that he has never smoked. He has never used smokeless tobacco. He reports that he does not drink alcohol and does not use drugs.    Allergies:  Allergies[1]    Medications:    Current Facility-Administered Medications:     LORazepam (Ativan) tab 1 mg, 1 mg, Oral, Q4H PRN    tetrahydrozoline (Visine) 0.05 % ophthalmic solution 1 drop, 1 drop, Right Eye, TID    docusate sodium (Colace) cap 100 mg, 100 mg, Oral, BID    sodium chloride 0.9% infusion, , Intravenous, Continuous    atorvastatin (Lipitor) tab 10 mg, 10 mg, Oral, Nightly    carvedilol (Coreg) tab 6.25 mg, 6.25 mg, Oral, BID     escitalopram (Lexapro) tab 10 mg, 10 mg, Oral, Daily    donepezil (Aricept) tab 10 mg, 10 mg, Oral, Nightly    [Held by provider] apixaban (Eliquis) tab 5 mg, 5 mg, Oral, BID    furosemide (Lasix) tab 40 mg, 40 mg, Oral, Daily    losartan (Cozaar) tab 25 mg, 25 mg, Oral, BID    QUEtiapine (SEROquel) tab 12.5 mg, 12.5 mg, Oral, Q6H PRN    QUEtiapine (SEROquel) tab 25 mg, 25 mg, Oral, TID    cefTRIAXone (Rocephin) 1 g in sodium chloride 0.9% 100 mL IVPB-ADDV, 1 g, Intravenous, Q24H    acetaminophen (Tylenol) tab 650 mg, 650 mg, Oral, Q6H PRN    ondansetron (Zofran) 4 MG/2ML injection 4 mg, 4 mg, Intravenous, Q6H PRN    Review of Systems:  Pertinent items are noted in HPI.    Physical Exam:  Awake, responsive, cooperative.  HEENT: Normocephalic.  Chest: Respirations non labored.  Abdomen: Soft, non tender.  : Wesley draining light red urine.     Laboratory Data:  Lab Results   Component Value Date    WBC 21.2 10/15/2024    HGB 16.9 10/15/2024    HCT 50.9 10/15/2024    .0 10/15/2024    CREATSERUM 1.06 10/15/2024    BUN 31 10/15/2024     10/15/2024    K 4.1 10/15/2024     10/15/2024    CO2 31.0 10/15/2024     10/15/2024    CA 9.5 10/15/2024    ALB 4.0 10/15/2024    ALKPHO 105 10/15/2024    BILT 1.7 10/15/2024    TP 7.0 10/15/2024    AST 26 10/15/2024    ALT 20 10/15/2024       Imaging:  CT Scan CONCLUSION:      1. Pulmonary edema.  No acute consolidation is seen.  Small bilateral pleural effusions.      2. No acute abnormality in the abdomen or pelvis.  No finding to explain left upper quadrant pain or leukocytosis.  No bowel obstruction.  Diverticulosis without evidence of acute diverticulitis.  No renal or ureteral stones or hydronephrosis.      3. Prostatomegaly and findings consistent with chronic bladder outlet obstruction.      4. Cardiomegaly.      5. Additional chronic or incidental findings are described in the body of this report.       Impression:  Patient Active Problem List    Diagnosis    Pure hypercholesterolemia    Hypertensive heart disease with systolic congestive heart failure, unspecified HF chronicity (HCC)    Pulmonary hypertension (HCC)    History of myocardial infarction    Coronary artery disease involving native heart without angina pectoris, unspecified vessel or lesion type    Aortic atherosclerosis (HCC)    Bifascicular block    Cardiomyopathy, ischemic    Anticoagulated on eliqius     Atrial fibrillation, unspecified type (HCC)    NSVT (nonsustained ventricular tachycardia) (HCC)    Chronic combined systolic and diastolic congestive heart failure (HCC) EF15-20% per ECHO 2016    Elevated hemoglobin (HCC)    Acute on chronic congestive heart failure, unspecified heart failure type (HCC)    Hypoxia    Acute cystitis with hematuria       Gross hematuria  UTI    Recommendations:  His hematuria appears to be related to a UTI. Continue antibiotics pending his culture results. He had a recent CT showing no renal stones and no renal or bladder masses.     Thank you for allowing me to participate in the care of your patient.    Daniel Roman MD  10/15/2024  4:22 PM         [1] No Known Allergies

## 2024-10-15 NOTE — PHYSICAL THERAPY NOTE
Therapy orders received via functional mobility screen, chart reviewed. Plan is for patient to return to memory care on hospice, acute PT services not indicated at this time. Will sign off, thank you.     Vera Jesus, PT

## 2024-10-15 NOTE — PLAN OF CARE
Phone call was received from telemetry reporting that patient had 5 beats of AIVR to V-tach with heart rate ranging from 94 to 167, and then resumed back to sinus rhythm 85. No complaints of chest pain or discomfort. No respiratory distress noted. Dr. Martinez was notified and is aware. No further orders obtained.

## 2024-10-16 PROBLEM — F05 DELIRIUM SUPERIMPOSED ON DEMENTIA: Status: ACTIVE | Noted: 2024-10-16

## 2024-10-16 PROBLEM — F01.518 MIXED VASCULAR AND NEURODEGENERATIVE DEMENTIA WITH BEHAVIORAL DISTURBANCE (HCC): Status: ACTIVE | Noted: 2024-10-16

## 2024-10-16 PROBLEM — F41.1 GENERALIZED ANXIETY DISORDER: Status: ACTIVE | Noted: 2024-10-16

## 2024-10-16 LAB
ANION GAP SERPL CALC-SCNC: 6 MMOL/L (ref 0–18)
BASOPHILS # BLD AUTO: 0.07 X10(3) UL (ref 0–0.2)
BASOPHILS NFR BLD AUTO: 0.4 %
BUN BLD-MCNC: 25 MG/DL (ref 9–23)
BUN/CREAT SERPL: 24.5 (ref 10–20)
CALCIUM BLD-MCNC: 9.5 MG/DL (ref 8.7–10.4)
CHLORIDE SERPL-SCNC: 110 MMOL/L (ref 98–112)
CO2 SERPL-SCNC: 31 MMOL/L (ref 21–32)
CREAT BLD-MCNC: 1.02 MG/DL
DEPRECATED RDW RBC AUTO: 52.2 FL (ref 35.1–46.3)
EGFRCR SERPLBLD CKD-EPI 2021: 69 ML/MIN/1.73M2 (ref 60–?)
EOSINOPHIL # BLD AUTO: 0.22 X10(3) UL (ref 0–0.7)
EOSINOPHIL NFR BLD AUTO: 1.2 %
ERYTHROCYTE [DISTWIDTH] IN BLOOD BY AUTOMATED COUNT: 15.2 % (ref 11–15)
GLUCOSE BLD-MCNC: 109 MG/DL (ref 70–99)
HCT VFR BLD AUTO: 49.5 %
HGB BLD-MCNC: 16.3 G/DL
IMM GRANULOCYTES # BLD AUTO: 0.14 X10(3) UL (ref 0–1)
IMM GRANULOCYTES NFR BLD: 0.8 %
LYMPHOCYTES # BLD AUTO: 1.4 X10(3) UL (ref 1–4)
LYMPHOCYTES NFR BLD AUTO: 8 %
MAGNESIUM SERPL-MCNC: 1.9 MG/DL (ref 1.6–2.6)
MCH RBC QN AUTO: 30.5 PG (ref 26–34)
MCHC RBC AUTO-ENTMCNC: 32.9 G/DL (ref 31–37)
MCV RBC AUTO: 92.7 FL
MONOCYTES # BLD AUTO: 1.68 X10(3) UL (ref 0.1–1)
MONOCYTES NFR BLD AUTO: 9.5 %
NEUTROPHILS # BLD AUTO: 14.1 X10 (3) UL (ref 1.5–7.7)
NEUTROPHILS # BLD AUTO: 14.1 X10(3) UL (ref 1.5–7.7)
NEUTROPHILS NFR BLD AUTO: 80.1 %
OSMOLALITY SERPL CALC.SUM OF ELEC: 309 MOSM/KG (ref 275–295)
PLATELET # BLD AUTO: 530 10(3)UL (ref 150–450)
POTASSIUM SERPL-SCNC: 4 MMOL/L (ref 3.5–5.1)
RBC # BLD AUTO: 5.34 X10(6)UL
SODIUM SERPL-SCNC: 147 MMOL/L (ref 136–145)
WBC # BLD AUTO: 17.6 X10(3) UL (ref 4–11)

## 2024-10-16 PROCEDURE — 90792 PSYCH DIAG EVAL W/MED SRVCS: CPT | Performed by: OTHER

## 2024-10-16 RX ORDER — LORAZEPAM 2 MG/ML
2 INJECTION INTRAMUSCULAR ONCE
Status: COMPLETED | OUTPATIENT
Start: 2024-10-16 | End: 2024-10-16

## 2024-10-16 RX ORDER — CIPROFLOXACIN 500 MG/1
500 TABLET, FILM COATED ORAL 2 TIMES DAILY
Qty: 20 TABLET | Refills: 0 | Status: SHIPPED | OUTPATIENT
Start: 2024-10-16 | End: 2024-10-26

## 2024-10-16 RX ORDER — QUETIAPINE FUMARATE 25 MG/1
12.5 TABLET, FILM COATED ORAL 3 TIMES DAILY PRN
Status: DISCONTINUED | OUTPATIENT
Start: 2024-10-16 | End: 2024-10-17

## 2024-10-16 RX ORDER — OLANZAPINE 2.5 MG/1
5 TABLET, FILM COATED ORAL EVERY EVENING
Status: DISCONTINUED | OUTPATIENT
Start: 2024-10-16 | End: 2024-10-17

## 2024-10-16 RX ORDER — TAMSULOSIN HYDROCHLORIDE 0.4 MG/1
0.4 CAPSULE ORAL
Status: DISCONTINUED | OUTPATIENT
Start: 2024-10-16 | End: 2024-10-17

## 2024-10-16 RX ORDER — LORAZEPAM 2 MG/ML
1 INJECTION INTRAMUSCULAR EVERY 6 HOURS PRN
Status: DISCONTINUED | OUTPATIENT
Start: 2024-10-16 | End: 2024-10-17

## 2024-10-16 RX ORDER — FINASTERIDE 5 MG/1
5 TABLET, FILM COATED ORAL DAILY
Status: DISCONTINUED | OUTPATIENT
Start: 2024-10-16 | End: 2024-10-17

## 2024-10-16 NOTE — PLAN OF CARE
Problem: Safety Risk - Non-Violent Restraints  Goal: Patient will remain free from self-harm  Description: INTERVENTIONS:  - Apply the least restrictive restraint to prevent harm  - Notify patient and family of reasons restraints applied  - Assess for any contributing factors to confusion (electrolyte disturbances, delirium, medications)  - Discontinue any unnecessary medical devices as soon as possible  - Assess the patient's physical comfort, circulation, skin condition, hydration, nutrition and elimination needs   - Reorient and redirection as needed  - Assess for the need to continue restraints  Outcome: Progressing     Problem: Patient Centered Care  Goal: Patient preferences are identified and integrated in the patient's plan of care  Description: Interventions:  - What would you like us to know as we care for you?   - Provide timely, complete, and accurate information to patient/family  - Incorporate patient and family knowledge, values, beliefs, and cultural backgrounds into the planning and delivery of care  - Encourage patient/family to participate in care and decision-making at the level they choose  - Honor patient and family perspectives and choices  Outcome: Progressing     Problem: Patient/Family Goals  Goal: Patient/Family Long Term Goal  Description: Patient's Long Term Goal: monitor urine     Interventions:  - Monitor vitals  - Monitor appropriate labs  - Administer medications as ordered  - Follow MD's orders  - Update patient on plan of care   - Discharge planning   - See additional Care Plan goals for specific interventions  Outcome: Progressing  Goal: Patient/Family Short Term Goal  Description: Patient's Short Term Goal: Monitor temperature, electrolytes    Interventions:   - Monitor vitals  - Monitor appropriate labs  - Administer medications as ordered  - Follow MD's orders  - Update patient on plan of care   - Discharge planning   - See additional Care Plan goals for specific  interventions  Outcome: Progressing     Problem: METABOLIC/FLUID AND ELECTROLYTES - ADULT  Goal: Electrolytes maintained within normal limits  Description: INTERVENTIONS:  - Monitor labs and rhythm and assess patient for signs and symptoms of electrolyte imbalances  - Administer electrolyte replacement as ordered  - Monitor response to electrolyte replacements, including rhythm and repeat lab results as appropriate  - Fluid restriction as ordered  - Instruct patient on fluid and nutrition restrictions as appropriate  Outcome: Progressing  Goal: Hemodynamic stability and optimal renal function maintained  Description: INTERVENTIONS:  - Monitor labs and assess for signs and symptoms of volume excess or deficit  - Monitor intake, output and patient weight  - Monitor urine specific gravity, serum osmolarity and serum sodium as indicated or ordered  - Monitor response to interventions for patient's volume status, including labs, urine output, blood pressure (other measures as available)  - Encourage oral intake as appropriate  - Instruct patient on fluid and nutrition restrictions as appropriate  Outcome: Progressing     Problem: HEMATOLOGIC - ADULT  Goal: Maintains hematologic stability  Description: INTERVENTIONS  - Assess for signs and symptoms of bleeding or hemorrhage  - Monitor labs and vital signs for trends  - Administer supportive blood products/factors, fluids and medications as ordered and appropriate  - Administer supportive blood products/factors as ordered and appropriate  Outcome: Progressing     Problem: MUSCULOSKELETAL - ADULT  Goal: Return mobility to safest level of function  Description: INTERVENTIONS:  - Assess patient stability and activity tolerance for standing, transferring and ambulating w/ or w/o assistive devices  - Assist with transfers and ambulation using safe patient handling equipment as needed  - Ensure adequate protection for wounds/incisions during mobilization  - Obtain PT/OT consults  as needed  - Advance activity as appropriate  - Communicate ordered activity level and limitations with patient/family  Outcome: Progressing     Problem: NEUROLOGICAL - ADULT  Goal: Achieves stable or improved neurological status  Description: INTERVENTIONS  - Assess for and report changes in neurological status  - Initiate measures to prevent increased intracranial pressure  - Maintain blood pressure and fluid volume within ordered parameters to optimize cerebral perfusion and minimize risk of hemorrhage  - Monitor temperature, glucose, and sodium. Initiate appropriate interventions as ordered  Outcome: Progressing

## 2024-10-16 NOTE — CM/SW NOTE
SW followed up on discharge planning.    Patient combative/in restraints.  [facility] prevents facilities from using restraints on residents.    SW spoke to DOMINICK rollins/ [facility] who confirmed they can take patient back tomorrow.  Ana stated they have had issues with aggression as well but medications (Ativan, Celexa) have helped.       DC planned for 10/17 at 1:00 PM.    PLAN: DC to [facility] w/hospice pending no restraints 10/17 at 1:00 PM    / to remain available for support and/or discharge planning.     Belkis Conley MSW, LSW [phone]
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
## 2024-10-16 NOTE — DISCHARGE INSTRUCTIONS
See medication list for med changes    Keep galarza if plans for hospice    Please arrange hospice to meet with daughter to discuss. We recommend if they do not do hospice that he is placed in palliative care with no res hospitalizations.

## 2024-10-16 NOTE — PAYOR COMM NOTE
--------------  ADMISSION REVIEW     Payor: [employer]  Subscriber #:  [id number]  Authorization Number: [id number]    Admit date: 10/14/24  Admit time:  9:52 PM           REVIEW DOCUMENTATION:     ED Provider Notes        ED Provider Notes signed by Neri Caldwell DO at 10/14/2024  8:55 PM        Patient Seen in: [facility] Emergency Department    History     Chief Complaint   Patient presents with    Hematuria       HPI    89-year-old male with a history of CHF, A-fib, on Eliquis with dementia,  from a nursing home who came with hematuria.  Patient does have DNR paperwork with his nursing home paperwork.  All history from EMS and nursing home since patient has dementia and a poor historian.    Physical Exam     ED Triage Vitals   BP 10/14/24 1817 (!) 165/106   Pulse 10/14/24 1817 97   Resp 10/14/24 1817 18   Temp 10/14/24 1817 98.2 °F (36.8 °C)   Temp src 10/14/24 1817 Temporal   SpO2 10/14/24 1817 95 %   O2 Device 10/14/24 1732 None (Room air)       All measures to prevent infection transmission during my interaction with the patient were taken. Handwashing was performed prior to and after the exam.  Stethoscope and any equipment used during my examination was cleaned with super sani-cloth germicidal wipes following the exam.     Physical Exam  Vitals and nursing note reviewed.   Cardiovascular:      Rate and Rhythm: Normal rate.      Pulses: Normal pulses.   Pulmonary:      Effort: Pulmonary effort is normal.   Abdominal:      Palpations: Abdomen is soft.   Musculoskeletal:         General: Normal range of motion.   Skin:     General: Skin is warm and dry.      Capillary Refill: Capillary refill takes less than 2 seconds.   Neurological:      General: No focal deficit present.      Mental Status: He is alert.         ED Course        Labs Reviewed   CBC WITH DIFFERENTIAL WITH PLATELET - Abnormal; Notable for the following components:       Result Value    WBC 17.7 (*)     HCT 53.4 (*)      RDW-SD 52.7 (*)     RDW 15.3 (*)     .0 (*)     Neutrophil Absolute Prelim 14.53 (*)     Neutrophil Absolute 14.53 (*)     Monocyte Absolute 1.56 (*)     All other components within normal limits   COMP METABOLIC PANEL (14) - Abnormal; Notable for the following components:    Glucose 112 (*)     BUN 39 (*)     BUN/CREA Ratio 31.2 (*)     Calculated Osmolality 308 (*)     eGFR-Cr 54 (*)     Bilirubin, Total 1.8 (*)     All other components within normal limits   URINALYSIS WITH CULTURE REFLEX - Abnormal; Notable for the following components:    Urine Color Red (*)     Clarity Urine Cloudy (*)     WBC Urine >50 (*)     RBC Urine >10 (*)     Bacteria Urine 2+ (*)     All other components within normal limits   UA MICROSCOPIC ONLY, URINE - Abnormal; Notable for the following components:    WBC Urine >50 (*)     RBC Urine >10 (*)     Bacteria Urine 2+ (*)     All other components within normal limits   ICTOTEST - Normal   URINE CULTURE, ROUTINE       As Interpreted by me    Imaging Results Available and Reviewed while in ED: No results found.  ED Medications Administered:   Medications   LORazepam (Ativan) 2 mg/mL injection 2 mg (2 mg Intramuscular Given 10/14/24 1742)   OLANZapine (Zyprexa) 10 mg in sterile water for injection (PF) IM injection (10 mg Intramuscular Given 10/14/24 1835)   cefTRIAXone (Rocephin) 2 g in sodium chloride 0.9% 100 mL IVPB-ADDV (2 g Intravenous New Bag 10/14/24 2022)         MDM     Vitals:    10/14/24 1817 10/14/24 2000   BP: (!) 165/106 118/68   Pulse: 97 87   Resp: 18    Temp: 98.2 °F (36.8 °C)    TempSrc: Temporal    SpO2: 95% 96%     *I personally reviewed and interpreted all ED vitals.    Pulse Ox:  , Room air, Normal     Monitor Interpretation:   normal sinus rhythm as interpreted by me.  The cardiac monitor was ordered to monitor cardiac rate and rhythm.    Differential Diagnosis/ Diagnostic Considerations: UTI, hematuria, hypercoagulable state    Complicating Factors: The  patient already has does not have any pertinent problems on file. to contribute to the complexity of this ED evaluation.    Medical Decision Making  Amount and/or Complexity of Data Reviewed  Independent Historian:      Details: Nursing home records, EMS  External Data Reviewed: notes.     Details: Reviewed previous ER visits along with outpatient neurology visits to help get history and medication list not documented admission records since patient has dementia and unable to get history out of the patient  Labs: ordered. Decision-making details documented in ED Course.    Risk  Decision regarding hospitalization.    Critical Care  Total time providing critical care: 30 minutes        Patient has severe dementia and is fighting status and swinging trying to hit and kick staff.  Will need to chemically sedate to evaluate patient.    Reviewed all results with patient.  Patient does have UTI causing hematuria with elevated WBC count of 17,000.  Given IV antibiotics and will need to admit for further evaluation.    Discussed case with urology Dr. Ho via PerfectServe and notified of    Discussed case with Dr. Butler who accepts admission  Condition upon leaving the department: Stable    Disposition and Plan     Clinical Impression:  1. Acute cystitis with hematuria        Disposition:  Admit    Hospital Problems       Present on Admission  Date Reviewed: 4/7/2022            ICD-10-CM Noted POA    * (Principal) Acute cystitis with hematuria N30.01 10/14/2024 Unknown          Signed by Neri Caldwell DO on 10/14/2024  8:55 PM             History and Physical    H&P signed by Bandar Martinez MD at 10/15/2024 11:12 AM    [facility]   Hospitalist Team  History and Physical  Admit Date:  10/14/2024     Is this a shared or split note between Advanced Practice Provider and Physician? Yes     ASSESSMENT / PLAN:   89 year old male with PMH chronic systolic heart failure, Dementia living in [facility]  care, generalized anxiety, hallucinations and delusions secondary to dementia, hyperlipidemia, pulmonary hypertension, atrial fibrillation on anticoagulation with recent admission from [facility] admitted with acute on chronic HFrEF and left sided thorax pain and discharged to [place] who presents with hematuria and leukocytosis, Wesley placed.  Discussed patient's current medical conditions with daughter Bridget who is medical power of , she was opted to see if [facility] care will take patient back under hospice care, social work consulted,see below for details      Hematuria   Leukocytosis   Right Eye Blepharitis  -tmax 99. WBC 17.1-->21.2  -urine with blood, cx pending  -blood cx pending   -holding asa and eliquis  -IVF   -continue erythromycin eye drops and cefriaxone   -urology consult      Left-sided thorax pain-noted last admission, no current complaints   -reproducible on exam and worse with coughing or laughing, suspect this represents a rib contusion     Alzheimer's dementia with behavioral disturbances  -s/p ativan and zyprexa on admission  -Continue home donepezil citalopram quetiapine  -lorazepam and seroquel as needed  -prn restraints      Chronic HFrEF- EF 45%  CAD previous MI  HL  Persistent Atrial Fibrillation/Atrial Flutter   -2022 echo with EF 45%, mild AI  -Continue home losartan carvedilol Lipitor and lasix   -holding eliquis      GOC  -DNR/Select, POLST in Chart, confirmed with daughter Bridget  -plans for hospice at [facility]      MA/ACO Reach  -ER Visits 2024: 3  -Admissions 2024: 2  -7 Day Re- Entry: [facility] 10/ 8-->[place]   -Consults: urology   -Discharge Needs:[facility] [place] with hospice with plans to transition to home hospice at time is closer   -Appointments: [ ] PCP MD LOLA Garg  -lytes in am  -IVF  -diet-general diet with thin liquids, no straw     Prophy  -SCD  -holding AC with hematuria      Dispo  -discharge back to [facility] with Hospice,  ~EDoD 10/16-10/17  PCP: Oscar Pittman MD        Outpatient records or previous hospital records reviewed.   Further recommendations pending patient's clinical course.  [facility] hospitalist  team to continue to follow patient while in house  Concerns regarding plan of care were discussed with patient. Patient agrees with plan as detailed above. Discussed plan of care with Dr. Martinez     Note: This chart was prepared using voice recognition software and may contain unintended word substitution errors.      Kacie Galeana RN, NP  [facility] Hospitalist Team   Available via Perfect Serve or Bubble Chat (check Availability)     10/15/2024       Assessment and Plan  Hematuria   Leukocytosis   Right Eye Blepharitis  -tmax 99. WBC 17.1-->21.2  -urine with blood, cx pending  -blood cx pending   -holding asa and eliquis  -IVF   -continue erythromycin eye drops and cefriaxone   -urology consult      Left-sided thorax pain-noted last admission, no current complaints   -reproducible on exam and worse with coughing or laughing, suspect this represents a rib contusion     Alzheimer's dementia with behavioral disturbances  -s/p ativan and zyprexa on admission  -Continue home donepezil citalopram quetiapine  -lorazepam and seroquel as needed  -prn restraints      Chronic HFrEF- EF 45%  CAD previous MI  HL  Persistent Atrial Fibrillation/Atrial Flutter   -2022 echo with EF 45%, mild AI  -Continue home losartan carvedilol Lipitor and lasix   -holding eliquis      Rest as above with above     [facility] Hospitalist   Bandar Martinez MD             Signed by Bandar Martinez MD on 10/15/2024 11:12 AM               10/16          Date of Service: 10/16/2024  9:19 AM     Signed         [facility]  part of [employer]     Progress Note       Subjective:   Angel Luis Alex is a(n) 89 year old male      Urine draining clear with galarza  Urine culture proteus, ESBL     Objective:   Blood pressure 133/56, pulse 80,  temperature 99 °F (37.2 °C), temperature source Axillary, resp. rate 18, weight 173 lb 6.4 oz (78.7 kg), SpO2 94%.     Sleeping  Galarza is clear        Results:         Lab Results   Component Value Date     WBC 17.6 (H) 10/16/2024     HGB 16.3 10/16/2024     HCT 49.5 10/16/2024     .0 (H) 10/16/2024     CREATSERUM 1.02 10/16/2024     BUN 25 (H) 10/16/2024      (H) 10/16/2024     K 4.0 10/16/2024      10/16/2024     CO2 31.0 10/16/2024      (H) 10/16/2024     CA 9.5 10/16/2024     ALB 4.0 10/15/2024     ALKPHO 105 10/15/2024     BILT 1.7 (H) 10/15/2024     TP 7.0 10/15/2024     AST 26 10/15/2024     ALT 20 10/15/2024     PTT 26.8 02/08/2022     INR 1.2 02/08/2022     TSH 1.400 02/08/2022     LIP 31 10/05/2024     GGT 59 03/07/2018     MG 1.9 10/16/2024     TROPHS 64 (HH) 10/05/2024     B12 688 11/12/2018         No results found.         Assessment & Plan:     Acute cystitis with hematuria  Continue IV abx for ESBL proteus  BPH with chronic bladder obstruction, urinary retention               Start finasteride               Consider adding flomax if low risk for falls     Continue galarza drainage for now               Consider voiding trial prior to discharge               If cannot void, will need galarza, urology follow up           **Certification        PHYSICIAN Certification of Need for Inpatient Hospitalization - Initial Certification     Patient will require inpatient services that will reasonably be expected to span two midnight's based on the clinical documentation in H+P.   Based on patients current state of illness, I anticipate that, after discharge, patient will require TBD.        Ashkan Ho MD  10/16/2024                        MEDICATIONS ADMINISTERED IN LAST 1 DAY:  acetaminophen (Tylenol) tab 650 mg       Date Action Dose Route User    10/15/2024 1845 Given 650 mg Oral Reta Muhammad, RN          atorvastatin (Lipitor) tab 10 mg       Date Action Dose Route User     10/15/2024 2215 Given 10 mg Oral Correa, Roxanne          carvedilol (Coreg) tab 6.25 mg       Date Action Dose Route User    10/16/2024 1037 Given 6.25 mg Oral Reta Muhammad RN    10/15/2024 2214 Given 6.25 mg Oral Correa, Roxanne          cefTRIAXone (Rocephin) 1 g in sodium chloride 0.9% 100 mL IVPB-ADDV       Date Action Dose Route User    10/15/2024 2103 New Bag 1 g Intravenous Correa, Roxanne          docusate sodium (Colace) cap 100 mg       Date Action Dose Route User    10/16/2024 1037 Given 100 mg Oral Reta Muhammad RN    10/15/2024 2215 Given 100 mg Oral Correa, Roxanne          donepezil (Aricept) tab 10 mg       Date Action Dose Route User    10/15/2024 2215 Given 10 mg Oral Correa, Roxanne          escitalopram (Lexapro) tab 10 mg       Date Action Dose Route User    10/16/2024 1037 Given 10 mg Oral Reta Muhammad RN          finasteride (Proscar) tab 5 mg       Date Action Dose Route User    10/16/2024 1040 Given 5 mg Oral Reta Muhammad RN          furosemide (Lasix) tab 40 mg       Date Action Dose Route User    10/16/2024 1037 Given 40 mg Oral Reta Muhammad RN          LORazepam (Ativan) 2 mg/mL injection 2 mg       Date Action Dose Route User    10/16/2024 0729 Given 2 mg Intravenous Reta Muhammad RN          losartan (Cozaar) tab 25 mg       Date Action Dose Route User    10/16/2024 1037 Given 25 mg Oral Reta Muhammad RN    10/15/2024 2215 Given 25 mg Oral Correa, Roxanne          meropenem (Merrem) 500 mg in sodium chloride 0.9% 100 mL IVPB-MBP       Date Action Dose Route User    10/16/2024 0851 New Bag 500 mg Intravenous Reta Muhammad RN          QUEtiapine (SEROquel) tab 25 mg       Date Action Dose Route User    10/16/2024 1037 Given 25 mg Oral Reta Muhammad RN    10/15/2024 2215 Given 25 mg Oral Correa, Roxanne    10/15/2024 1845 Given 25 mg Oral Reta Muhammad, RN          tamsulosin (Flomax) cap 0.4 mg       Date Action Dose Route User    10/16/2024 1040 Given  0.4 mg Oral Reta Muhammad RN          tetrahydrozoline (Visine) 0.05 % ophthalmic solution 1 drop       Date Action Dose Route User    10/16/2024 1036 Given 1 drop Right Eye Reta Muhammad RN    10/15/2024 2215 Given 1 drop Right Eye Roxanne Correa    10/15/2024 1844 Given 1 drop Right Eye Reta Muhammad RN            Vitals (last day)       Date/Time Temp Pulse Resp BP SpO2 Weight O2 Device O2 Flow Rate (L/min) [facility]    10/16/24 1035 98.8 °F (37.1 °C) 71 18 131/70 100 % -- None (Room air) --     10/16/24 0849 99 °F (37.2 °C) 80 18 133/56 94 % -- None (Room air) --     10/16/24 0737 97.4 °F (36.3 °C) 96 20 152/109 100 % -- None (Room air) --     10/16/24 0432 98.9 °F (37.2 °C) 73 20 114/77 98 % -- None (Room air) -- T    10/15/24 2245 -- 76 -- -- -- -- -- -- EO    10/15/24 2209 99.8 °F (37.7 °C) 79 20 116/63 93 % -- None (Room air) -- T    10/15/24 1900 -- 81 -- -- -- -- -- -- MO    10/15/24 1840 99.9 °F (37.7 °C) 85 20 163/82 97 % -- None (Room air) -- MARTHA    10/15/24 1408 99 °F (37.2 °C) 87 20 142/68 97 % -- None (Room air) -- MARTHA    10/15/24 1054 -- 80 -- -- -- -- -- -- ST    10/15/24 1037 98.4 °F (36.9 °C) 83 20 132/68 100 % -- None (Room air) -- MARTHA    10/15/24 0448 99.9 °F (37.7 °C) 83 21 131/70 99 % -- None (Room air) --     10/15/24 0053 -- 94 -- 142/77 -- -- -- -- FELICITA

## 2024-10-16 NOTE — PROGRESS NOTES
[facility]  part of formerly [facility]    Progress Note    Angel Luis Alex Patient Status:  Inpatient    1932 MRN [id number]   Location [facility] 5SW/SE Attending Bandar Martinez MD   Hosp Day # 2 PCP Oscar Pittman MD     Subjective:   Angel Luis Alex is a(n) 89 year old male     Urine draining clear with galarza  Urine culture proteus, ESBL    Objective:   Blood pressure 133/56, pulse 80, temperature 99 °F (37.2 °C), temperature source Axillary, resp. rate 18, weight 173 lb 6.4 oz (78.7 kg), SpO2 94%.    Sleeping  Galarza is clear      Results:   Lab Results   Component Value Date    WBC 17.6 (H) 10/16/2024    HGB 16.3 10/16/2024    HCT 49.5 10/16/2024    .0 (H) 10/16/2024    CREATSERUM 1.02 10/16/2024    BUN 25 (H) 10/16/2024     (H) 10/16/2024    K 4.0 10/16/2024     10/16/2024    CO2 31.0 10/16/2024     (H) 10/16/2024    CA 9.5 10/16/2024    ALB 4.0 10/15/2024    ALKPHO 105 10/15/2024    BILT 1.7 (H) 10/15/2024    TP 7.0 10/15/2024    AST 26 10/15/2024    ALT 20 10/15/2024    PTT 26.8 2022    INR 1.2 2022    TSH 1.400 2022    LIP 31 10/05/2024    GGT 59 2018    MG 1.9 10/16/2024    TROPHS 64 (HH) 10/05/2024    B12 688 2018       No results found.        Assessment & Plan:     Acute cystitis with hematuria  Continue IV abx for ESBL proteus  BPH with chronic bladder obstruction, urinary retention   Start finasteride   Consider adding flomax if low risk for falls    Continue galarza drainage for now   Consider voiding trial prior to discharge   If cannot void, will need galarza, urology follow up        **Certification      PHYSICIAN Certification of Need for Inpatient Hospitalization - Initial Certification    Patient will require inpatient services that will reasonably be expected to span two midnight's based on the clinical documentation in H+P.   Based on patients current state of illness, I anticipate that, after discharge, patient will  require TBD.      Ashkan Ho MD  10/16/2024

## 2024-10-16 NOTE — PROGRESS NOTES
[facility] Hospitalist Progress Note     CC: Hospital Follow up    PCP: Oscar Pittman MD       Assessment/Plan:     Principal Problem:    Acute cystitis with hematuria      Patient is a 89 year old male with PMH chronic systolic heart failure, Dementia living in [facility] care, generalized anxiety, hallucinations and delusions secondary to dementia, hyperlipidemia, pulmonary hypertension, atrial fibrillation on anticoagulation with recent admission from [facility] admitted with acute on chronic HFrEF and left sided thorax pain and discharged to [place] who presents with hematuria and leukocytosis, Wesley placed.  Discussed patient's current medical conditions with daughter Bridget who is medical power of , who requested [facility] take patient back under hospice care, social work consulted.  Will need to be off restraint for 24 hours before transitioning into Hospice.      Hematuria   Leukocytosis   Right Eye Blepharitis  -tmax 99. WBC 17.1-->21.2  -urine with blood, cx Proteus mirabilis ESBL   -blood cx pending   -holding asa and eliquis  -IVF   -continue erythromycin eye drops and cefriaxone   -urology consult   -Meropenem started      Left-sided thorax pain-noted last admission, no current complaints   -reproducible on exam and worse with coughing or laughing, suspect this represents a rib contusion     Alzheimer's dementia with behavioral disturbances  -s/p ativan and zyprexa on admission  -Continue home donepezil citalopram quetiapine  -lorazepam and seroquel as needed  -prn restraints   -Psych consulted for medical management to help fade restraints      Chronic HFrEF- EF 45%  CAD previous MI  HL  Persistent Atrial Fibrillation/Atrial Flutter   -2022 echo with EF 45%, mild AI  -Continue home losartan carvedilol Lipitor and lasix   -holding eliquis      GOC  -DNR/Select, POLST in Chart, confirmed with daughter Bridget  -plans for hospice at [facility] once off restraints       MA/ACO Reach  -ER Visits 2024: 3  -Admissions 2024: 2  -7 Day Re- Entry: [place] 10/ 8-->[place]   -Consults: urology   -Discharge Needs:[facility] [place] with hospice with plans to transition to home hospice at time is closer   -Appointments: [ ] PCP Oscar Pittman MD     FEN  -lytes in am  -IVF complete   -diet-general diet with thin liquids, no straw     Prophy  -SCD  -holding AC with hematuria      Dispo  -discharge back to [facility] [place] with Hospice, ~EDoD 10/17-10/18  PCP: Oscar Pittman MD        Outpatient records or previous hospital records reviewed.   Further recommendations pending patient's clinical course.  [facility] hospitalist  team to continue to follow patient while in house  Concerns regarding plan of care were discussed with patient. Patient agrees with plan as detailed above.      Bandar Boles MD   [facility] Hospitalist Team   Available via Perfect Serve or Bubble Chat (check Availability)     Subjective:     Agitated this AM and violent with RNs.  Security was called.  Ativan given.     OBJECTIVE:    Blood pressure 131/70, pulse 71, temperature 98.8 °F (37.1 °C), temperature source Axillary, resp. rate 18, weight 173 lb 6.4 oz (78.7 kg), SpO2 100%.    Temp:  [97.4 °F (36.3 °C)-99.9 °F (37.7 °C)] 98.8 °F (37.1 °C)  Pulse:  [71-96] 71  Resp:  [18-20] 18  BP: (114-163)/() 131/70  SpO2:  [93 %-100 %] 100 %      Intake/Output:    Intake/Output Summary (Last 24 hours) at 10/16/2024 1154  Last data filed at 10/16/2024 1100  Gross per 24 hour   Intake 740 ml   Output 1075 ml   Net -335 ml       Last 3 Weights   10/14/24 2300 173 lb 6.4 oz (78.7 kg)   10/08/24 0621 173 lb (78.5 kg)   10/07/24 0254 174 lb 8 oz (79.2 kg)   10/06/24 0607 177 lb 11.2 oz (80.6 kg)   10/05/24 1341 178 lb 8 oz (81 kg)   10/05/24 0821 170 lb (77.1 kg)   06/16/24 0906 180 lb (81.6 kg)       Exam   GENERAL: CALM AND IN RESTRAINTS   NEUROLOGIC: A/A; Ox1  RESPIRATORY: normal expansion; non labored, CTA   CARDIOVASCULAR:  IRREGULAR   ABDOMEN:  Soft, BS+; non distended, non tender    GENITAL/: ORDOÑEZ WITH BLOOD, NO CLOTS  EXTREMITIES: no LE edema      Data Review:       Labs:     Recent Labs   Lab 10/14/24  1852 10/15/24  0559 10/16/24  0517   RBC 5.69 5.50 5.34   HGB 17.4 16.9 16.3   HCT 53.4* 50.9 49.5   MCV 93.8 92.5 92.7   MCH 30.6 30.7 30.5   MCHC 32.6 33.2 32.9   RDW 15.3* 15.2* 15.2*   NEPRELIM 14.53*  --  14.10*   WBC 17.7* 21.2* 17.6*   .0* 578.0* 530.0*         Recent Labs   Lab 10/14/24  1852 10/15/24  0559 10/16/24  0517   * 101* 109*   BUN 39* 31* 25*   CREATSERUM 1.25 1.06 1.02   EGFRCR 54* 66 69   CA 10.1 9.5 9.5    145 147*   K 4.0 4.1 4.0    108 110   CO2 27.0 31.0 31.0       Recent Labs   Lab 10/14/24  1852 10/15/24  0559   ALT 25 20   AST 33 26   ALB 4.3 4.0         Imaging:  No results found.      Meds:      meropenem  500 mg Intravenous Q12H    finasteride  5 mg Oral Daily    tamsulosin  0.4 mg Oral Daily @ 0700    tetrahydrozoline  1 drop Right Eye TID    docusate sodium  100 mg Oral BID    atorvastatin  10 mg Oral Nightly    carvedilol  6.25 mg Oral BID    escitalopram  10 mg Oral Daily    donepezil  10 mg Oral Nightly    [Held by provider] apixaban  5 mg Oral BID    furosemide  40 mg Oral Daily    losartan  25 mg Oral BID    QUEtiapine  25 mg Oral TID         OLANZapine (Zyprexa) 5 mg in sterile water for injection (PF) IM injection    LORazepam    LORazepam    QUEtiapine    acetaminophen    ondansetron

## 2024-10-17 VITALS
RESPIRATION RATE: 18 BRPM | SYSTOLIC BLOOD PRESSURE: 167 MMHG | OXYGEN SATURATION: 100 % | TEMPERATURE: 98 F | BODY MASS INDEX: 25 KG/M2 | HEART RATE: 78 BPM | DIASTOLIC BLOOD PRESSURE: 84 MMHG | WEIGHT: 173.38 LBS

## 2024-10-17 LAB
ANION GAP SERPL CALC-SCNC: 7 MMOL/L (ref 0–18)
BASOPHILS # BLD AUTO: 0.09 X10(3) UL (ref 0–0.2)
BASOPHILS NFR BLD AUTO: 0.5 %
BUN BLD-MCNC: 26 MG/DL (ref 9–23)
BUN/CREAT SERPL: 24.8 (ref 10–20)
CALCIUM BLD-MCNC: 9.4 MG/DL (ref 8.7–10.4)
CHLORIDE SERPL-SCNC: 112 MMOL/L (ref 98–112)
CO2 SERPL-SCNC: 29 MMOL/L (ref 21–32)
CREAT BLD-MCNC: 1.05 MG/DL
DEPRECATED RDW RBC AUTO: 53.5 FL (ref 35.1–46.3)
EGFRCR SERPLBLD CKD-EPI 2021: 67 ML/MIN/1.73M2 (ref 60–?)
EOSINOPHIL # BLD AUTO: 0.35 X10(3) UL (ref 0–0.7)
EOSINOPHIL NFR BLD AUTO: 2 %
ERYTHROCYTE [DISTWIDTH] IN BLOOD BY AUTOMATED COUNT: 15.3 % (ref 11–15)
GLUCOSE BLD-MCNC: 120 MG/DL (ref 70–99)
HCT VFR BLD AUTO: 51.7 %
HGB BLD-MCNC: 16.6 G/DL
IMM GRANULOCYTES # BLD AUTO: 0.17 X10(3) UL (ref 0–1)
IMM GRANULOCYTES NFR BLD: 1 %
LYMPHOCYTES # BLD AUTO: 1.97 X10(3) UL (ref 1–4)
LYMPHOCYTES NFR BLD AUTO: 11.5 %
MCH RBC QN AUTO: 30.5 PG (ref 26–34)
MCHC RBC AUTO-ENTMCNC: 32.1 G/DL (ref 31–37)
MCV RBC AUTO: 94.9 FL
MONOCYTES # BLD AUTO: 1.79 X10(3) UL (ref 0.1–1)
MONOCYTES NFR BLD AUTO: 10.4 %
NEUTROPHILS # BLD AUTO: 12.77 X10 (3) UL (ref 1.5–7.7)
NEUTROPHILS # BLD AUTO: 12.77 X10(3) UL (ref 1.5–7.7)
NEUTROPHILS NFR BLD AUTO: 74.6 %
OSMOLALITY SERPL CALC.SUM OF ELEC: 312 MOSM/KG (ref 275–295)
PLATELET # BLD AUTO: 576 10(3)UL (ref 150–450)
POTASSIUM SERPL-SCNC: 4.5 MMOL/L (ref 3.5–5.1)
RBC # BLD AUTO: 5.45 X10(6)UL
SODIUM SERPL-SCNC: 148 MMOL/L (ref 136–145)
WBC # BLD AUTO: 17.1 X10(3) UL (ref 4–11)

## 2024-10-17 PROCEDURE — 99233 SBSQ HOSP IP/OBS HIGH 50: CPT | Performed by: OTHER

## 2024-10-17 RX ORDER — DONEPEZIL HYDROCHLORIDE 10 MG/1
10 TABLET, FILM COATED ORAL NIGHTLY
Status: SHIPPED | COMMUNITY
Start: 2024-10-17

## 2024-10-17 RX ORDER — FUROSEMIDE 40 MG/1
40 TABLET ORAL
Status: SHIPPED | COMMUNITY
Start: 2024-10-17

## 2024-10-17 RX ORDER — OLANZAPINE 5 MG/1
5 TABLET ORAL EVERY EVENING
Status: SHIPPED | COMMUNITY
Start: 2024-10-17

## 2024-10-17 RX ORDER — TAMSULOSIN HYDROCHLORIDE 0.4 MG/1
0.4 CAPSULE ORAL
Status: SHIPPED | COMMUNITY
Start: 2024-10-18

## 2024-10-17 RX ORDER — FINASTERIDE 5 MG/1
5 TABLET, FILM COATED ORAL DAILY
Status: SHIPPED | COMMUNITY
Start: 2024-10-18

## 2024-10-17 NOTE — CM/SW NOTE
10/17/24 1000   Discharge disposition   Expected discharge disposition Home or Self   Post Acute Care Provider   ([facility])   Additional Home Care/Hospice Provider   (Hospice)   Discharge transportation [facility]     DALTON confirmed with NP Kacie and RN Tracy that pt is medically ready for discharge today. DALTON discussed with DOMINICK Perez from [facility]  to arrange a time for discharge.  DALTON scheduled [facility] for 1:00 PM.  RN is aware of discharge time and location and will inform patient.  SW updated patient's daughter Bridget on discharge time.  RN to attach IP Transfer Report to After Visit Summary packet for transfer to [facility].    PLAN: DC to [facility] w/hospice (to be arranged on site at facility) via [facility] at 1:00 PM.  PCS completed.    RN # to report: [phone]     Belkis Conley MSW, LSW [id number]

## 2024-10-17 NOTE — PROGRESS NOTES
Patient is a 89 year old   male with reside in [facility]. memory care with history of CHF, JAMEEL, HTN, A-fib, hyperlipidemia and dementia who presented to the hospital with hematuria and leukocytosis.  Patient admitted to the medical floor.  Patient demonstrated severe agitation.  Psych consult requested for evaluation and advice.    Consult Duration     The patient seen for over 35-minute, follow-up evaluation, over 50% counseling and coordinating care addressing agitation and mood alternation.    Record reviewed, communication with attending, communication with RN and patient seen face to face evaluation.    History of Present Illness:   The patient is seen yesterday and the diagnosis of delirium superimposed on dementia provided.  Patient also started on Zyprexa 5 mg nightly with discontinuation of Seroquel 3 times daily.    According to the team the patient has slept well and the patient has been doing much better with no agitation and was able to eat breakfast with the team today.    Attending indicated that the patient is going to nursing home on hospice.    The patient seen in his room today and the patient demonstrated appropriate awakening and alertness.  Patient presented reasonably appropriate reporting that he believe it is fall and it is possibly September.  Patient reporting that he does not remember the year.    The patient reporting that he believe he slept well.  He seemed comfortable not complaining of any physical problem.    The patient was able to interact appropriately and demonstrated some attentiveness.    Otherwise the patient with history of depression anxiety and tendency to have mood alternation.  Patient with difficulty sleeping but otherwise comply to medication management in the nursing home.    Patient today reporting that he has been taking with being given to him from medication.  Patient denied any side effect or denying any extended morning sedation.  Patient  denied any suicidal or homicidal ideation.    Although patient presented with some confusion patient did not demonstrate any bizarre behavior or response to internal stimuli this morning.    Past Psychiatric/Medication History:  1. Prior diagnoses: Generalized anxiety disorder, dementia  2. Past psychiatric inpatient: Currently in memory care  3. Past outpatient history: Patient receiving medication from primary physician  4. Past suicide history: No suicidal attempts  5. Medication history: Celexa, Ativan and Seroquel    Social History:   Elderly male who is currently in the memory care center    Family History:  No psychiatric family history reported  Medical History:   Past Medical History  Past Medical History:    Acute on chronic systolic congestive heart failure (HCC)    Anxiety    Chronic combined systolic and diastolic congestive heart failure (HCC)    Chronic sinus bradycardia    Chronic systolic congestive heart failure (HCC)    Coronary artery disease involving native heart without angina pectoris, unspecified vessel or lesion type    Dementia (HCC)    Dementia (HCC)    Elevated hemoglobin (HCC)    High blood pressure    History of myocardial infarction    Hyperlipidemia    Hypertensive CHF (congestive heart failure) (HCC)    Paroxysmal A-fib (HCC)    Pulmonary hypertension (HCC)       Past Surgical History  History reviewed. No pertinent surgical history.    Family History  No family history on file.    Social History  Social History     Socioeconomic History    Marital status:    Tobacco Use    Smoking status: Never    Smokeless tobacco: Never   Vaping Use    Vaping status: Never Used   Substance and Sexual Activity    Alcohol use: No     Alcohol/week: 0.0 standard drinks of alcohol    Drug use: No     Social Drivers of Health     Financial Resource Strain: Low Risk  (8/2/2023)    Received from [facility]    Overall Financial Resource Strain (CARDIA)     Difficulty of Paying Living  Expenses: Not very hard   Food Insecurity: Unknown (10/15/2024)    Food Insecurity     Food Insecurity: Patient unable to answer   Transportation Needs: Unknown (10/15/2024)    Transportation Needs     Lack of Transportation: Patient unable to answer   Physical Activity: Unknown (8/2/2023)    Received from [facility]    Exercise Vital Sign     Days of Exercise per Week: 0 days   Stress: Unknown (7/26/2022)    Received from [facility]    [employer] - Occupational Stress Questionnaire     Feeling of Stress : Patient declined   Social Connections: Unknown (8/2/2023)    Received from [facility]    Social Connection and Isolation Panel [NHANES]     Frequency of Communication with Friends and Family: More than three times a week     Frequency of Social Gatherings with Friends and Family: More than three times a week     Attends [employer] Services: Patient declined     Active Member of Clubs or Organizations: No     Marital Status:    Housing Stability: Unknown (10/15/2024)    Housing Stability     Housing Instability: Patient unable to answer           Current Medications:  Current Facility-Administered Medications   Medication Dose Route Frequency    meropenem (Merrem) 500 mg in sodium chloride 0.9% 100 mL IVPB-MBP  500 mg Intravenous Q12H    finasteride (Proscar) tab 5 mg  5 mg Oral Daily    tamsulosin (Flomax) cap 0.4 mg  0.4 mg Oral Daily @ 0700    OLANZapine (Zyprexa) 5 mg in sterile water for injection (PF) IM injection  5 mg Intramuscular Q8H PRN    LORazepam (Ativan) 2 mg/mL injection 1 mg  1 mg Intravenous Q6H PRN    OLANZapine (ZyPREXA) tab 5 mg  5 mg Oral QPM    QUEtiapine (SEROquel) tab 12.5 mg  12.5 mg Oral TID PRN    LORazepam (Ativan) tab 1 mg  1 mg Oral Q4H PRN    tetrahydrozoline (Visine) 0.05 % ophthalmic solution 1 drop  1 drop Right Eye TID    docusate sodium (Colace) cap 100 mg  100 mg Oral BID    atorvastatin (Lipitor) tab 10 mg  10  mg Oral Nightly    carvedilol (Coreg) tab 6.25 mg  6.25 mg Oral BID    escitalopram (Lexapro) tab 10 mg  10 mg Oral Daily    donepezil (Aricept) tab 10 mg  10 mg Oral Nightly    [Held by provider] apixaban (Eliquis) tab 5 mg  5 mg Oral BID    furosemide (Lasix) tab 40 mg  40 mg Oral Daily    losartan (Cozaar) tab 25 mg  25 mg Oral BID    QUEtiapine (SEROquel) tab 12.5 mg  12.5 mg Oral Q6H PRN    acetaminophen (Tylenol) tab 650 mg  650 mg Oral Q6H PRN    ondansetron (Zofran) 4 MG/2ML injection 4 mg  4 mg Intravenous Q6H PRN     Medications Prior to Admission   Medication Sig    docusate sodium 100 MG Oral Cap Take 1 capsule (100 mg total) by mouth 2 (two) times daily.    Polyethylene Glycol 3350 17 g Oral Powd Pack Take 17 g by mouth daily as needed (constipation).    LORazepam 1 MG Oral Tab Take 1 tablet (1 mg total) by mouth every 4 (four) hours as needed for Anxiety. Uses topical gel 0.5ml(1mg) to lt wrist    acetaminophen 325 MG Oral Tab Take 2 tablets (650 mg total) by mouth TID & HS.    citalopram 20 MG Oral Tab Take 1 tablet (20 mg total) by mouth daily.    QUEtiapine 25 MG Oral Tab Take 0.5 tablets (12.5 mg total) by mouth every 6 (six) hours as needed (agitation/irritability).    QUEtiapine 25 MG Oral Tab Take 1 tablet (25 mg total) by mouth 3 (three) times daily.    tetrahydrozoline 0.05 % Ophthalmic Solution Place 1 drop into the right eye as needed.    ATORVASTATIN 10 MG Oral Tab TAKE 1 TABLET BY MOUTH EVERY NIGHT    carvedilol 6.25 MG Oral Tab Take 1 tablet (6.25 mg total) by mouth 2 (two) times daily.    ELIQUIS 5 MG Oral Tab TAKE 1 TABLET(5 MG) BY MOUTH TWICE DAILY    Losartan Potassium 25 MG Oral Tab Take 1 tablet (25 mg total) by mouth 2 (two) times daily.    Cholecalciferol (VITAMIN D) 50 MCG (2000 UT) Oral Tab Take by mouth.    aspirin 81 MG Oral Tab Take 1 tablet (81 mg total) by mouth daily.    erythromycin 5 MG/GM Ophthalmic Ointment Place 1 Application into the right eye every 8 (eight) hours.  3 days, ending through 10/10/24    [DISCONTINUED] furosemide 40 MG Oral Tab Take 1 tablet (40 mg total) by mouth daily.    [DISCONTINUED] donepezil 10 MG Oral Tab Take 1 tablet (10 mg total) by mouth nightly.       Allergies  Allergies[1]    Review of Systems:   As by Admitting/Attending    Results:   Laboratory Data:  Lab Results   Component Value Date    WBC 17.1 (H) 10/17/2024    HGB 16.6 10/17/2024    HCT 51.7 10/17/2024    .0 (H) 10/17/2024    CREATSERUM 1.05 10/17/2024    BUN 26 (H) 10/17/2024     (H) 10/17/2024    K 4.5 10/17/2024     10/17/2024    CO2 29.0 10/17/2024     (H) 10/17/2024    CA 9.4 10/17/2024    ALB 4.0 10/15/2024    ALKPHO 105 10/15/2024    TP 7.0 10/15/2024    AST 26 10/15/2024    ALT 20 10/15/2024    PTT 26.8 02/08/2022    INR 1.2 02/08/2022    TSH 1.400 02/08/2022    LIP 31 10/05/2024    GGT 59 03/07/2018    MG 1.9 10/16/2024    B12 688 11/12/2018         Imaging:  No results found.    Vital Signs:   Blood pressure (!) 167/84, pulse 78, temperature 97.5 °F (36.4 °C), temperature source Oral, resp. rate 18, weight 78.7 kg (173 lb 6.4 oz), SpO2 100%.    Mental Status Exam:   Appearance: Stated age male, in hospital gown, laying down in hospital bed.  Patient with a soft restraint  Psychomotor: The patient has not been demonstrating any psychomotor agitation with a slight slowness.  Orientation: Alert and oriented to person but not to place, date or condition.  Patient is disoriented  Gait: Not evaluated.  Attitude/Coorperation: Patient demonstrated effort to cooperate and was reasonable considering his cognitive impairment.  Behavior: No reported episode of agitation today.  Speech: Regular rate and rhythm speech.  Soft low-tone speech  Mood: Some apathy otherwise reporting that he is feeling fine.  Affect: Restricted affect otherwise neutral.  Thought process: Patient demonstrated improvement in his attentive thought process.  Thought content: No reports of   suicidal or homicidal ideation.  Perceptions: Patient has been demonstrating response to internal stimuli.  But seem more clear today.  Concentration: Grossly impaired  Memory: Grossly impaired  Intellect: Average.  Judgment and Insight: Questionable.     Impression:     Delirium superimposed on dementia.  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  Generalized excites order.  acute cystitis with hematuria      The patient is 89-year-old  male with a chronic history of depression, anxiety dementia additional to multiple medical condition who presented to the hospital with UTI.  Patient has been demonstrating increased alternation in his mood, increased confusion, restlessness, difficulty sleeping with increased hallucination and aggressive behavior demonstrating a delirious episode superimposed on his dementia and cognitive impairment    Seroquel is appropriate medication otherwise recommendation to be avoided in A-fib due to orthostatic hypotension and change in heart rate.  It is preferable to be changed to Zyprexa or Seroquel XR.    10/17/2024: The patient was started on Zyprexa and has been receiving the right medical treatment to target the underlying medical condition has demonstrated improvement from his delirium and agitation today.    Discussed risk and benefit, acknowledging the current symptom and severity.  At this point, I would recommend the following approach:     Focus on safety  Focus on education and support.  Focus on insight orientation helping the patient understand diagnosis and treatment plan.  Continue Aricept 10 mg nightly.  Continue Lexapro 10 mg daily.  Discontinue Seroquel.  Continue Zyprexa 5 mg in the evening.  Utilize Ativan 1 mg IV every 6 hours as needed for agitation.  Coordinate plan with team    Orders This Visit:  Orders Placed This Encounter   Procedures    CBC With Differential With Platelet    Comp Metabolic Panel (14)    Urinalysis with  Culture Reflex    UA Microscopic only, urine    Ictotest    Basic Metabolic Panel (8)    CBC With Differential With Platelet    Hepatic Function Panel (7)    Magnesium    Urine Culture, Routine    Blood Culture       Meds This Visit:  Requested Prescriptions     Signed Prescriptions Disp Refills    ciprofloxacin 500 MG Oral Tab 20 tablet 0     Sig: Take 1 tablet (500 mg total) by mouth 2 (two) times daily for 10 days.       Jonah Sadler MD  10/17/2024    Note to Patient: The 21st Century Cures Act makes medical notes like these available to patients in the interest of transparency. However, be advised this is a medical document. It is intended as peer to peer communication. It is written in medical language and may contain abbreviations or verbiage that are unfamiliar. It may appear blunt or direct. Medical documents are intended to carry relevant information, facts as evident, and the clinical opinion of the practitioner. This note may have been transcribed using a voice dictation system. Voice recognition errors may occur. This should not be taken to alter the content or meaning of this note.            [1] No Known Allergies

## 2024-10-17 NOTE — PLAN OF CARE
Problem: Safety Risk - Non-Violent Restraints  Goal: Patient will remain free from self-harm  Description: INTERVENTIONS:  - Apply the least restrictive restraint to prevent harm  - Notify patient and family of reasons restraints applied  - Assess for any contributing factors to confusion (electrolyte disturbances, delirium, medications)  - Discontinue any unnecessary medical devices as soon as possible  - Assess the patient's physical comfort, circulation, skin condition, hydration, nutrition and elimination needs   - Reorient and redirection as needed  - Assess for the need to continue restraints  Outcome: Progressing     Problem: Patient Centered Care  Goal: Patient preferences are identified and integrated in the patient's plan of care  Description: Interventions:  - What would you like us to know as we care for you? From [place]  - Provide timely, complete, and accurate information to patient/family  - Incorporate patient and family knowledge, values, beliefs, and cultural backgrounds into the planning and delivery of care  - Encourage patient/family to participate in care and decision-making at the level they choose  - Honor patient and family perspectives and choices  Outcome: Progressing     Problem: Patient/Family Goals  Goal: Patient/Family Long Term Goal  Description: Patient's Long Term Goal: Absence of infection    Interventions:  - Medication adherence  - See additional Care Plan goals for specific interventions  Outcome: Progressing  Goal: Patient/Family Short Term Goal  Description: Patient's Short Term Goal: Absence of aggression    Interventions:   - See additional Care Plan goals for specific interventions  Outcome: Progressing     Problem: MUSCULOSKELETAL - ADULT  Goal: Return mobility to safest level of function  Description: INTERVENTIONS:  - Assess patient stability and activity tolerance for standing, transferring and ambulating w/ or w/o assistive devices  - Assist with transfers and  ambulation using safe patient handling equipment as needed  - Ensure adequate protection for wounds/incisions during mobilization  - Obtain PT/OT consults as needed  - Advance activity as appropriate  - Communicate ordered activity level and limitations with patient/family  Outcome: Progressing     Problem: NEUROLOGICAL - ADULT  Goal: Achieves stable or improved neurological status  Description: INTERVENTIONS  - Assess for and report changes in neurological status  - Initiate measures to prevent increased intracranial pressure  - Maintain blood pressure and fluid volume within ordered parameters to optimize cerebral perfusion and minimize risk of hemorrhage  - Monitor temperature, glucose, and sodium. Initiate appropriate interventions as ordered  Outcome: Progressing     Problem: Risk for Violence/Aggression  Goal: Absence of Violence/Aggression  Description: INTERVENTIONS:   - Identify precipitating factors for behavior   - Notify Charge RN/Provider   - Consider decreasing stimulation   - Consider distraction measures   - Consider discussion with provider regarding prn meds    - Consider NABILA (Moderate Risk only)   - Consider Code Support (High Risk only)   - Consider room safety checks   - Consider restraints  Outcome: Progressing     Problem: METABOLIC/FLUID AND ELECTROLYTES - ADULT  Goal: Electrolytes maintained within normal limits  Description: INTERVENTIONS:  - Monitor labs and rhythm and assess patient for signs and symptoms of electrolyte imbalances  - Administer electrolyte replacement as ordered  - Monitor response to electrolyte replacements, including rhythm and repeat lab results as appropriate  - Fluid restriction as ordered  - Instruct patient on fluid and nutrition restrictions as appropriate  Outcome: Not Progressing  Goal: Hemodynamic stability and optimal renal function maintained  Description: INTERVENTIONS:  - Monitor labs and assess for signs and symptoms of volume excess or deficit  -  Monitor intake, output and patient weight  - Monitor urine specific gravity, serum osmolarity and serum sodium as indicated or ordered  - Monitor response to interventions for patient's volume status, including labs, urine output, blood pressure (other measures as available)  - Encourage oral intake as appropriate  - Instruct patient on fluid and nutrition restrictions as appropriate  Outcome: Not Progressing     Problem: HEMATOLOGIC - ADULT  Goal: Maintains hematologic stability  Description: INTERVENTIONS  - Assess for signs and symptoms of bleeding or hemorrhage  - Monitor labs and vital signs for trends  - Administer supportive blood products/factors, fluids and medications as ordered and appropriate  - Administer supportive blood products/factors as ordered and appropriate  Outcome: Not Progressing

## 2024-10-17 NOTE — PLAN OF CARE
Patient discharge orders in place. No acute changes at this time. Safety precautions in place. Call light within reach.  Problem: Patient Centered Care  Goal: Patient preferences are identified and integrated in the patient's plan of care  Description: Interventions:  - What would you like us to know as we care for you?   - Provide timely, complete, and accurate information to patient/family  - Incorporate patient and family knowledge, values, beliefs, and cultural backgrounds into the planning and delivery of care  - Encourage patient/family to participate in care and decision-making at the level they choose  - Honor patient and family perspectives and choices  Outcome: Progressing     Problem: Safety Risk - Non-Violent Restraints  Goal: Patient will remain free from self-harm  Description: INTERVENTIONS:  - Apply the least restrictive restraint to prevent harm  - Notify patient and family of reasons restraints applied  - Assess for any contributing factors to confusion (electrolyte disturbances, delirium, medications)  - Discontinue any unnecessary medical devices as soon as possible  - Assess the patient's physical comfort, circulation, skin condition, hydration, nutrition and elimination needs   - Reorient and redirection as needed  - Assess for the need to continue restraints  Outcome: Adequate for Discharge     Problem: Patient/Family Goals  Goal: Patient/Family Long Term Goal  Description: Patient's Long Term Goal:     Interventions:  -   - See additional Care Plan goals for specific interventions  Outcome: Adequate for Discharge  Goal: Patient/Family Short Term Goal  Description: Patient's Short Term Goal:     Interventions:   -   - See additional Care Plan goals for specific interventions  Outcome: Adequate for Discharge     Problem: METABOLIC/FLUID AND ELECTROLYTES - ADULT  Goal: Electrolytes maintained within normal limits  Description: INTERVENTIONS:  - Monitor labs and rhythm and assess patient for  signs and symptoms of electrolyte imbalances  - Administer electrolyte replacement as ordered  - Monitor response to electrolyte replacements, including rhythm and repeat lab results as appropriate  - Fluid restriction as ordered  - Instruct patient on fluid and nutrition restrictions as appropriate  Outcome: Adequate for Discharge  Goal: Hemodynamic stability and optimal renal function maintained  Description: INTERVENTIONS:  - Monitor labs and assess for signs and symptoms of volume excess or deficit  - Monitor intake, output and patient weight  - Monitor urine specific gravity, serum osmolarity and serum sodium as indicated or ordered  - Monitor response to interventions for patient's volume status, including labs, urine output, blood pressure (other measures as available)  - Encourage oral intake as appropriate  - Instruct patient on fluid and nutrition restrictions as appropriate  Outcome: Adequate for Discharge     Problem: HEMATOLOGIC - ADULT  Goal: Maintains hematologic stability  Description: INTERVENTIONS  - Assess for signs and symptoms of bleeding or hemorrhage  - Monitor labs and vital signs for trends  - Administer supportive blood products/factors, fluids and medications as ordered and appropriate  - Administer supportive blood products/factors as ordered and appropriate  Outcome: Adequate for Discharge     Problem: MUSCULOSKELETAL - ADULT  Goal: Return mobility to safest level of function  Description: INTERVENTIONS:  - Assess patient stability and activity tolerance for standing, transferring and ambulating w/ or w/o assistive devices  - Assist with transfers and ambulation using safe patient handling equipment as needed  - Ensure adequate protection for wounds/incisions during mobilization  - Obtain PT/OT consults as needed  - Advance activity as appropriate  - Communicate ordered activity level and limitations with patient/family  Outcome: Adequate for Discharge     Problem: NEUROLOGICAL -  ADULT  Goal: Achieves stable or improved neurological status  Description: INTERVENTIONS  - Assess for and report changes in neurological status  - Initiate measures to prevent increased intracranial pressure  - Maintain blood pressure and fluid volume within ordered parameters to optimize cerebral perfusion and minimize risk of hemorrhage  - Monitor temperature, glucose, and sodium. Initiate appropriate interventions as ordered  Outcome: Adequate for Discharge     Problem: Risk for Violence/Aggression  Goal: Absence of Violence/Aggression  Description: INTERVENTIONS:   - Identify precipitating factors for behavior   - Notify Charge RN/Provider   - Consider decreasing stimulation   - Consider distraction measures   - Consider discussion with provider regarding prn meds    - Consider NABILA (Moderate Risk only)   - Consider Code Support (High Risk only)   - Consider room safety checks   - Consider restraints  Outcome: Adequate for Discharge

## 2024-10-17 NOTE — PROGRESS NOTES
[facility] reviewed [facility] plan. Psychiatry team following for medication management.

## 2024-10-17 NOTE — DISCHARGE SUMMARY
[facility] Hospitalist Discharge Summary   Patient ID:  Angel Luis Alex  [id number]  89 year old  7/1/1932    Admit date: 10/14/2024  Discharge date: 10/17/2024    Primary Care Physician: Oscar Pittman MD   Attending Physician: Bandar Martinez MD   Consults:   Consultants         Provider   Role Specialty     Jonah Sadler MD      Consulting Physician Psychiatry     Ashkan Ho MD      Consulting Physician UROLOGY            Hospital Discharge Diagnoses:   Acute cystitis with hematuria  ----See D/C Summary for further Dx    Risk of Readmission Lace+ Score: 77  59-90 High Risk  29-58 Medium Risk  0-28   Low Risk.    TCM Follow-Up Recommendation:  LACE > 58: High Risk of readmission after discharge from the hospital.    Please note that only IHP DMG and EMG patients enrolled in the Medicare ACO, BCBS ACO and BCBS HMOs will be handled by the [facility] Care Management team.  For all other patients, please follow usual protocol for discharge care transition.    Reason for admission  89 year old male with PMH chronic systolic heart failure, Dementia living in [facility], generalized anxiety, hallucinations and delusions secondary to dementia, hyperlipidemia, pulmonary hypertension, atrial fibrillation on anticoagulation with recent admission from [facility] admitted with acute on chronic HFrEF and left sided thorax pain and discharged to [place]     Patient calm and in restraints.  He is able to tell me his name is Angel Luis, date and year he was born as well as his daughter's Bridget.  He thinks he is in [place].  He is not sure why he is in the hospital.  Patient apparently was agitated last night and did get Ativan and Zyprexa.     Per conversation with daughter Bridget patient has not been able to progress much at rehab as a result of his agitation and cognitive issues, they have been needing to give him numerous medications.  He is on aspirin and Eliquis and was found to have hematuria and  currently has a Galarza in place with no evidence of blood clots.  On arrival patient was noted to have leukocytosis with a white count of 21 with a low-grade temp of 99.  He was being treated for right eye blepharitis and currently on ceftriaxone for possible UTI.     Discussed patient's current medical conditions as well as reviewed patient's wishes with daughter.  Decision was made to see if [facility] can take him back under hospice care, social work consulted.    Hospital Course:     nahun is a 89 year old male with PMH chronic systolic heart failure, Dementia living in [facility], generalized anxiety, hallucinations and delusions secondary to dementia, hyperlipidemia, pulmonary hypertension, atrial fibrillation on anticoagulation with recent admission from [facility] admitted with acute on chronic HFrEF and left sided thorax pain and discharged to [place] who presented acute urinary retention and hematuria found to have ESBL UTI, patient will be discharged on Cipro for 10-day course.  Due to patient's behavioral issues and underlying Alzheimer's disease he was seen by psych and was started on Zyprexa and his Seroquel was discontinued.  Goals of care conversation were undertaken with patient's daughter Bridget and we recommended patient be returned back to [facility] care as he was having difficulty participating in rehab.  In addition we advocated for hospice at [facility] facility, daughter will meet with them and make final decision.  Patient discharged with Galarza catheter and patient was taken off of aspirin and Eliquis, will family decides against hospice would consider voiding trial and resumption of anticoagulation, see below for details     Hematuria 2/2 ESBL UTI  Acute Urinary Retention  -WBC 17.1 at discharge  -urine with blood, cx Proteus mirabilis ESBL   -blood cx NGTD  -recent CT without stones or massess   -plans to keep off asa and eliquis  -galarza placed on  10/14, plans to keep if hospice planed otherwise voiding trial as outpt  -abx- cipro to complete 10 day coarse      Alzheimer's dementia with behavioral disturbances  -Continue home donepezil citalopram   -Stop quetiapine as recommended by psych with afib   -prn lorazepam  -Zyprexa added     Hypernatremia  -Na 148  -change lasix to prn     Dysphagia   -thin liquids no straw rec by speech      Chronic HFrEF- EF 45%  CAD previous MI  HL  Persistent Atrial Fibrillation/Atrial Flutter   -2022 echo with EF 45%, mild AI  -pro bnp 64  -CXR with trace b/l pleural effusions, increased interstitial markings and patchy opacity Left lung and lung base  -Continue home losartan carvedilol Lipitor and lasix   -changed lasix to as needed for sob or edema with hypernatremia   -stopped asa and eliquis with hematuria, if no plans for hospice could consider resumption      GOC  -DNR/Select, POLST in Chart, confirmed with daughter Bridget  -plans for hospice eval at [facility]      MA/RACQUEL Reach  -ER Visits 2024: 3  -Admissions 2024: 2  -7 Day Re- Entry: [facility] 10/ 8-->[place]   -Consults: urology   -Discharge Needs:[facility] with hospice with plans to transition to home hospice at time is closer   -Appointments: as needed PCP Oscar Pittman MD    EXAM:   GENERAL: no apparent distress  NEURO: A/A Ox1  RESP: non labored, CTA  CARDIO: irregular  ABD: soft, NT, ND, BS+  : Wesley  EXTREMITIES: no edema    Discharge Instructions     Medication List        START taking these medications      ciprofloxacin 500 MG Tabs  Commonly known as: Cipro  Take 1 tablet (500 mg total) by mouth 2 (two) times daily for 10 days.     finasteride 5 MG Tabs  Commonly known as: Proscar  Start taking on: October 18, 2024     OLANZapine 5 MG Tabs  Commonly known as: ZyPREXA     tamsulosin 0.4 MG Caps  Commonly known as: Flomax  Start taking on: October 18, 2024            CHANGE how you take these medications      furosemide 40 MG Tabs  Commonly known as:  Lasix  What changed:   when to take this  reasons to take this  additional instructions            CONTINUE taking these medications      carvedilol 6.25 MG Tabs  Commonly known as: Coreg  Take 1 tablet (6.25 mg total) by mouth 2 (two) times daily.     citalopram 20 MG Tabs  Commonly known as: CeleXA     docusate sodium 100 MG Caps  Commonly known as: Colace     donepezil 10 MG Tabs  Commonly known as: Aricept     LORazepam 1 MG Tabs  Commonly known as: Ativan  Take 1 tablet (1 mg total) by mouth every 4 (four) hours as needed for Anxiety. Uses topical gel 0.5ml(1mg) to lt wrist     losartan 25 MG Tabs  Commonly known as: Cozaar  Take 1 tablet (25 mg total) by mouth 2 (two) times daily.     Polyethylene Glycol 3350 17 g Pack  Commonly known as: MIRALAX     tetrahydrozoline 0.05 % Soln  Commonly known as: Visine            STOP taking these medications      acetaminophen 325 MG Tabs  Commonly known as: Tylenol     aspirin 81 MG Tabs     atorvastatin 10 MG Tabs  Commonly known as: Lipitor     Eliquis 5 MG Tabs  Generic drug: apixaban     erythromycin 5 MG/GM Oint  Commonly known as: Romycin     QUEtiapine 25 MG Tabs  Commonly known as: SEROquel     Vitamin D 50 MCG (2000 UT) Tabs               Where to Get Your Medications        These medications were sent to [id number] - [place], [place] - [address] AT [facility], [phone], [phone]  [address] [address]      Phone: [phone]   ciprofloxacin 500 MG Tabs         Code Status: DNAR/Selective Treatment    Important follow up:   Follow-up Information       Oscar Pitmtan MD Follow up.    Specialty: Internal Medicine  Why: As needed  Contact information:  [address]  [address]  [facility] [address] [phone]                           Disposition: memory care with hospice  Discharged Condition: stable      Additional patient instructions:  See medication list for med changes    Keep galarza if plans for hospice  otherwise can do voiding trial     If no plans for hospice can retrial eliquis if no further bleeding     Please arrange hospice to meet with daughter to discuss. We recommend if they do not do hospice that he is placed in palliative care with no res hospitalizations.   =========================================================================================================================    I Reconciled current and discharge medications on day of discharge  Patient had opportunity to ask questions and state understand and agree with therapeutic plan as outlined    Total Time Coordinating Care: greater than 30 minutes  Is this a shared or split note between Advanced Practice Provider and Physician? Yes    Note: This chart was prepared using voice recognition software and may contain unintended word substitution errors.     Kacie Galeana RN, NP   [facility] Hospitalist Team   10/17/2024      SEE ATTENDING NOTE BELOW    Patient seen and examined independently.  Discussed with APN and agree with note above    Patient improved.  Stable for discharge to memory care with hospice    GENERAL: CALM   NEUROLOGIC: A/A; Ox1  RESPIRATORY: normal expansion; non labored, CTA   CARDIOVASCULAR: IRREGULAR   ABDOMEN:  Soft, BS+; non distended, non tender    GENITAL/: ORDOÑEZ WITH BLOOD, NO CLOTS  EXTREMITIES: no LE edema    Time of discharge >30 minutes    [facility] Hospitalist  Bandar Martinez MD

## 2024-10-18 NOTE — PAYOR COMM NOTE
--------------  DISCHARGE REVIEW    Payor: [employer]  Subscriber #:  [id number]  Authorization Number: [id number]    Admit date: 10/14/24  Admit time:   9:52 PM  Discharge Date: 10/17/2024  1:32 PM     Admitting Physician: Jenny [facility] DO  Attending Physician:  Padma att. providers found  Primary Care Physician: Sandy Cormier MD          Discharge Summary Notes        Discharge Summary signed by Bandar Martinez MD at 10/17/2024 12:30 PM       Author: Bandar Martinez MD Specialty: HOSPITALIST Author Type: Physician    Filed: 10/17/2024 12:30 PM Date of Service: 10/17/2024 12:08 PM Status: Signed    : Bandar Martinez MD (Physician)    Related Notes: Original Note by Kacie Galeana NP (Nurse Practitioner) filed at 10/17/2024 12:08 PM           [facility] Hospitalist Discharge Summary   Patient ID:  Angel Luis Alex  [id number]  89 year old  7/1/1932    Admit date: 10/14/2024  Discharge date: 10/17/2024    Primary Care Physician: Oscar Pittman MD   Attending Physician: Bandar Martinez MD   Consults:   Consultants         Provider   Role Specialty     Jonah Sadler MD      Consulting Physician Psychiatry     Ashkan Ho MD      Consulting Physician UROLOGY            Hospital Discharge Diagnoses:   Acute cystitis with hematuria  ----See D/C Summary for further Dx    Risk of Readmission Lace+ Score: 77  59-90 High Risk  29-58 Medium Risk  0-28   Low Risk.    TCM Follow-Up Recommendation:  LACE > 58: High Risk of readmission after discharge from the hospital.    Please note that only IHP DMG and EMG patients enrolled in the Medicare ACO, BCBS ACO and BCBS HMOs will be handled by the [facility] Care Management team.  For all other patients, please follow usual protocol for discharge care transition.    Reason for admission  89 year old male with PMH chronic systolic heart failure, Dementia living in [facility] memory care, generalized anxiety, hallucinations and delusions secondary to  dementia, hyperlipidemia, pulmonary hypertension, atrial fibrillation on anticoagulation with recent admission from memory care [place] admitted with acute on chronic HFrEF and left sided thorax pain and discharged to [place]     Patient calm and in restraints.  He is able to tell me his name is Angel Luis, date and year he was born as well as his daughter's Bridget.  He thinks he is in [place].  He is not sure why he is in the hospital.  Patient apparently was agitated last night and did get Ativan and Zyprexa.     Per conversation with daughter Bridget patient has not been able to progress much at rehab as a result of his agitation and cognitive issues, they have been needing to give him numerous medications.  He is on aspirin and Eliquis and was found to have hematuria and currently has a Wesley in place with no evidence of blood clots.  On arrival patient was noted to have leukocytosis with a white count of 21 with a low-grade temp of 99.  He was being treated for right eye blepharitis and currently on ceftriaxone for possible UTI.     Discussed patient's current medical conditions as well as reviewed patient's wishes with daughter.  Decision was made to see if [facility] can take him back under hospice care, social work consulted.    Hospital Course:     nahun is a 89 year old male with PMH chronic systolic heart failure, Dementia living in [facility], generalized anxiety, hallucinations and delusions secondary to dementia, hyperlipidemia, pulmonary hypertension, atrial fibrillation on anticoagulation with recent admission from memory care center admitted with acute on chronic HFrEF and left sided thorax pain and discharged to [place] who presented acute urinary retention and hematuria found to have ESBL UTI, patient will be discharged on Cipro for 10-day course.  Due to patient's behavioral issues and underlying Alzheimer's disease he was seen by psych and was started on Zyprexa and  his Seroquel was discontinued.  Goals of care conversation were undertaken with patient's daughter Bridget and we recommended patient be returned back to memory care as he was having difficulty participating in rehab.  In addition we advocated for hospice at memory care facility, daughter will meet with them and make final decision.  Patient discharged with Galarza catheter and patient was taken off of aspirin and Eliquis, will family decides against hospice would consider voiding trial and resumption of anticoagulation, see below for details     Hematuria 2/2 ESBL UTI  Acute Urinary Retention  -WBC 17.1 at discharge  -urine with blood, cx Proteus mirabilis ESBL   -blood cx NGTD  -recent CT without stones or massess   -plans to keep off asa and eliquis  -galarza placed on 10/14, plans to keep if hospice planed otherwise voiding trial as outpt  -abx- cipro to complete 10 day coarse      Alzheimer's dementia with behavioral disturbances  -Continue home donepezil citalopram   -Stop quetiapine as recommended by psych with afib   -prn lorazepam  -Zyprexa added     Hypernatremia  -Na 148  -change lasix to prn     Dysphagia   -thin liquids no straw rec by speech      Chronic HFrEF- EF 45%  CAD previous MI  HL  Persistent Atrial Fibrillation/Atrial Flutter   -2022 echo with EF 45%, mild AI  -pro bnp 64  -CXR with trace b/l pleural effusions, increased interstitial markings and patchy opacity Left lung and lung base  -Continue home losartan carvedilol Lipitor and lasix   -changed lasix to as needed for sob or edema with hypernatremia   -stopped asa and eliquis with hematuria, if no plans for hospice could consider resumption      GOC  -DNR/Select, POLST in Chart, confirmed with daughter Bridget  -plans for hospice eval at [facility]      MA/RACQUEL Reach  -ER Visits 2024: 3  -Admissions 2024: 2  -7 Day Re- Entry: [facility] 10/ 8-->[facility]   -Consults: urology   -Discharge Needs:[facility] with hospice with plans to transition to home  hospice at time is closer   -Appointments: as needed PCP Oscar Pittman MD    EXAM:   GENERAL: no apparent distress  NEURO: A/A Ox1  RESP: non labored, CTA  CARDIO: irregular  ABD: soft, NT, ND, BS+  : Wesley  EXTREMITIES: no edema    Discharge Instructions     Medication List        START taking these medications      ciprofloxacin 500 MG Tabs  Commonly known as: Cipro  Take 1 tablet (500 mg total) by mouth 2 (two) times daily for 10 days.     finasteride 5 MG Tabs  Commonly known as: Proscar  Start taking on: October 18, 2024     OLANZapine 5 MG Tabs  Commonly known as: ZyPREXA     tamsulosin 0.4 MG Caps  Commonly known as: Flomax  Start taking on: October 18, 2024            CHANGE how you take these medications      furosemide 40 MG Tabs  Commonly known as: Lasix  What changed:   when to take this  reasons to take this  additional instructions            CONTINUE taking these medications      carvedilol 6.25 MG Tabs  Commonly known as: Coreg  Take 1 tablet (6.25 mg total) by mouth 2 (two) times daily.     citalopram 20 MG Tabs  Commonly known as: CeleXA     docusate sodium 100 MG Caps  Commonly known as: Colace     donepezil 10 MG Tabs  Commonly known as: Aricept     LORazepam 1 MG Tabs  Commonly known as: Ativan  Take 1 tablet (1 mg total) by mouth every 4 (four) hours as needed for Anxiety. Uses topical gel 0.5ml(1mg) to lt wrist     losartan 25 MG Tabs  Commonly known as: Cozaar  Take 1 tablet (25 mg total) by mouth 2 (two) times daily.     Polyethylene Glycol 3350 17 g Pack  Commonly known as: MIRALAX     tetrahydrozoline 0.05 % Soln  Commonly known as: Visine            STOP taking these medications      acetaminophen 325 MG Tabs  Commonly known as: Tylenol     aspirin 81 MG Tabs     atorvastatin 10 MG Tabs  Commonly known as: Lipitor     Eliquis 5 MG Tabs  Generic drug: apixaban     erythromycin 5 MG/GM Oint  Commonly known as: Romycin     QUEtiapine 25 MG Tabs  Commonly known as: SEROquel     Vitamin D  50 MCG (2000 UT) Tabs               Where to Get Your Medications        These medications were sent to [id number] - [place], [place] - [address] AT [facility] [address] [facility] [address] [facility] [address] [facility], [phone], [phone]  [address], [place] [address]      Phone: [phone]   ciprofloxacin 500 MG Tabs         Code Status: DNAR/Selective Treatment    Important follow up:   Follow-up Information       Oscar Pittman MD Follow up.    Specialty: Internal Medicine  Why: As needed  Contact information:  [address]  [address]  [facility] [address] [phone]                           Disposition: memory care with hospice  Discharged Condition: stable      Additional patient instructions:  See medication list for med changes    Keep galarza if plans for hospice otherwise can do voiding trial     If no plans for hospice can retrial eliquis if no further bleeding     Please arrange hospice to meet with daughter to discuss. We recommend if they do not do hospice that he is placed in palliative care with no res hospitalizations.   =========================================================================================================================    I Reconciled current and discharge medications on day of discharge  Patient had opportunity to ask questions and state understand and agree with therapeutic plan as outlined    Total Time Coordinating Care: greater than 30 minutes  Is this a shared or split note between Advanced Practice Provider and Physician? Yes    Note: This chart was prepared using voice recognition software and may contain unintended word substitution errors.     Kacie Galeana RN, NP   [facility] Hospitalist Team   10/17/2024      SEE ATTENDING NOTE BELOW    Patient seen and examined independently.  Discussed with APN and agree with note above    Patient improved.  Stable for discharge to memory care with hospice    GENERAL: CALM   NEUROLOGIC: A/A; Ox1  RESPIRATORY: normal expansion; non  labored, CTA   CARDIOVASCULAR: IRREGULAR   ABDOMEN:  Soft, BS+; non distended, non tender    GENITAL/: ORDOÑEZ WITH BLOOD, NO CLOTS  EXTREMITIES: no LE edema    Time of discharge >30 minutes    [facility] Hospitalist  Bandar Martinez MD             Electronically signed by Bandar Martinez MD on 10/17/2024 12:30 PM         REVIEWER COMMENTS